# Patient Record
Sex: FEMALE | Race: WHITE | NOT HISPANIC OR LATINO | Employment: UNEMPLOYED | URBAN - METROPOLITAN AREA
[De-identification: names, ages, dates, MRNs, and addresses within clinical notes are randomized per-mention and may not be internally consistent; named-entity substitution may affect disease eponyms.]

---

## 2017-02-27 ENCOUNTER — HOSPITAL ENCOUNTER (EMERGENCY)
Facility: MEDICAL CENTER | Age: 42
End: 2017-02-27
Attending: EMERGENCY MEDICINE
Payer: COMMERCIAL

## 2017-02-27 ENCOUNTER — APPOINTMENT (OUTPATIENT)
Dept: RADIOLOGY | Facility: MEDICAL CENTER | Age: 42
End: 2017-02-27
Attending: EMERGENCY MEDICINE
Payer: COMMERCIAL

## 2017-02-27 VITALS
OXYGEN SATURATION: 98 % | TEMPERATURE: 97.6 F | DIASTOLIC BLOOD PRESSURE: 70 MMHG | HEART RATE: 61 BPM | HEIGHT: 64 IN | RESPIRATION RATE: 16 BRPM | BODY MASS INDEX: 21.34 KG/M2 | WEIGHT: 125 LBS | SYSTOLIC BLOOD PRESSURE: 127 MMHG

## 2017-02-27 DIAGNOSIS — G89.29 CHRONIC PELVIC PAIN IN FEMALE: ICD-10-CM

## 2017-02-27 DIAGNOSIS — R10.2 CHRONIC PELVIC PAIN IN FEMALE: ICD-10-CM

## 2017-02-27 LAB
ALBUMIN SERPL BCP-MCNC: 4.3 G/DL (ref 3.2–4.9)
ALBUMIN/GLOB SERPL: 1.4 G/DL
ALP SERPL-CCNC: 36 U/L (ref 30–99)
ALT SERPL-CCNC: 13 U/L (ref 2–50)
ANION GAP SERPL CALC-SCNC: 6 MMOL/L (ref 0–11.9)
APPEARANCE UR: CLEAR
AST SERPL-CCNC: 15 U/L (ref 12–45)
BASOPHILS # BLD AUTO: 1.4 % (ref 0–1.8)
BASOPHILS # BLD: 0.06 K/UL (ref 0–0.12)
BILIRUB SERPL-MCNC: 0.5 MG/DL (ref 0.1–1.5)
BILIRUB UR QL STRIP.AUTO: NEGATIVE
BUN SERPL-MCNC: 8 MG/DL (ref 8–22)
CALCIUM SERPL-MCNC: 9.5 MG/DL (ref 8.5–10.5)
CHLORIDE SERPL-SCNC: 108 MMOL/L (ref 96–112)
CO2 SERPL-SCNC: 23 MMOL/L (ref 20–33)
COLOR UR: COLORLESS
CREAT SERPL-MCNC: 0.68 MG/DL (ref 0.5–1.4)
CULTURE IF INDICATED INDCX: NO UA CULTURE
EOSINOPHIL # BLD AUTO: 0.08 K/UL (ref 0–0.51)
EOSINOPHIL NFR BLD: 1.8 % (ref 0–6.9)
ERYTHROCYTE [DISTWIDTH] IN BLOOD BY AUTOMATED COUNT: 42.5 FL (ref 35.9–50)
GFR SERPL CREATININE-BSD FRML MDRD: >60 ML/MIN/1.73 M 2
GLOBULIN SER CALC-MCNC: 3 G/DL (ref 1.9–3.5)
GLUCOSE SERPL-MCNC: 103 MG/DL (ref 65–99)
GLUCOSE UR STRIP.AUTO-MCNC: NEGATIVE MG/DL
HCG SERPL QL: NEGATIVE
HCT VFR BLD AUTO: 40.8 % (ref 37–47)
HGB BLD-MCNC: 14 G/DL (ref 12–16)
IMM GRANULOCYTES # BLD AUTO: 0.01 K/UL (ref 0–0.11)
IMM GRANULOCYTES NFR BLD AUTO: 0.2 % (ref 0–0.9)
KETONES UR STRIP.AUTO-MCNC: NEGATIVE MG/DL
LEUKOCYTE ESTERASE UR QL STRIP.AUTO: NEGATIVE
LIPASE SERPL-CCNC: 20 U/L (ref 11–82)
LYMPHOCYTES # BLD AUTO: 0.95 K/UL (ref 1–4.8)
LYMPHOCYTES NFR BLD: 21.9 % (ref 22–41)
MCH RBC QN AUTO: 32.1 PG (ref 27–33)
MCHC RBC AUTO-ENTMCNC: 34.3 G/DL (ref 33.6–35)
MCV RBC AUTO: 93.6 FL (ref 81.4–97.8)
MICRO URNS: NORMAL
MONOCYTES # BLD AUTO: 0.18 K/UL (ref 0–0.85)
MONOCYTES NFR BLD AUTO: 4.2 % (ref 0–13.4)
NEUTROPHILS # BLD AUTO: 3.05 K/UL (ref 2–7.15)
NEUTROPHILS NFR BLD: 70.5 % (ref 44–72)
NITRITE UR QL STRIP.AUTO: NEGATIVE
NRBC # BLD AUTO: 0 K/UL
NRBC BLD AUTO-RTO: 0 /100 WBC
PH UR STRIP.AUTO: 7.5 [PH]
PLATELET # BLD AUTO: 228 K/UL (ref 164–446)
PMV BLD AUTO: 9.4 FL (ref 9–12.9)
POTASSIUM SERPL-SCNC: 3.8 MMOL/L (ref 3.6–5.5)
PROT SERPL-MCNC: 7.3 G/DL (ref 6–8.2)
PROT UR QL STRIP: NEGATIVE MG/DL
RBC # BLD AUTO: 4.36 M/UL (ref 4.2–5.4)
RBC UR QL AUTO: NEGATIVE
SODIUM SERPL-SCNC: 137 MMOL/L (ref 135–145)
SP GR UR STRIP.AUTO: 1.01
WBC # BLD AUTO: 4.3 K/UL (ref 4.8–10.8)

## 2017-02-27 PROCEDURE — 85025 COMPLETE CBC W/AUTO DIFF WBC: CPT

## 2017-02-27 PROCEDURE — 83690 ASSAY OF LIPASE: CPT

## 2017-02-27 PROCEDURE — 99284 EMERGENCY DEPT VISIT MOD MDM: CPT

## 2017-02-27 PROCEDURE — 36415 COLL VENOUS BLD VENIPUNCTURE: CPT

## 2017-02-27 PROCEDURE — 74177 CT ABD & PELVIS W/CONTRAST: CPT

## 2017-02-27 PROCEDURE — 80053 COMPREHEN METABOLIC PANEL: CPT

## 2017-02-27 PROCEDURE — 84703 CHORIONIC GONADOTROPIN ASSAY: CPT

## 2017-02-27 PROCEDURE — 82272 OCCULT BLD FECES 1-3 TESTS: CPT

## 2017-02-27 PROCEDURE — 700117 HCHG RX CONTRAST REV CODE 255: Performed by: EMERGENCY MEDICINE

## 2017-02-27 PROCEDURE — 81003 URINALYSIS AUTO W/O SCOPE: CPT

## 2017-02-27 RX ADMIN — IOHEXOL 100 ML: 350 INJECTION, SOLUTION INTRAVENOUS at 13:15

## 2017-02-27 ASSESSMENT — LIFESTYLE VARIABLES: DO YOU DRINK ALCOHOL: NO

## 2017-02-27 ASSESSMENT — PAIN SCALES - GENERAL: PAINLEVEL_OUTOF10: 7

## 2017-02-27 NOTE — ED AVS SNAPSHOT
2/27/2017          Linn Mcconnell  [address]  [place] [address]    Dear Linn:    [facility] wants to ensure your discharge home is safe and you or your loved ones have had all your questions answered regarding your care after you leave the hospital.    You may receive a telephone call within two days of your discharge.  This call is to make certain you understand your discharge instructions as well as ensure we provided you with the best care possible during your stay with us.     The call will only last approximately 3-5 minutes and will be done by a nurse.    Once again, we want to ensure your discharge home is safe and that you have a clear understanding of any next steps in your care.  If you have any questions or concerns, please do not hesitate to contact us, we are here for you.  Thank you for choosing [facility] for your healthcare needs.    Sincerely,    Luis E Dowell    [facility]

## 2017-02-27 NOTE — ED PROVIDER NOTES
"ED Provider Note    CHIEF COMPLAINT  Chief Complaint   Patient presents with   • Abdominal Pain     lower pelvic pain \"feels like something is in there moving\", \"it flares up every other week usually with my menstrual cycle\".   • Abscess     \"between my colon and my uterus\".  Pt reports it was drained on Sept 1, 2016.  Pt had transvaginal US done last week which shows \"hematoma in my uterus\"   • Menstrual Problem     frequent irregular periods.  Pt had uterine ablation 3 years ago.       HPI  Linn Mcconnell is a 41 y.o. female who presents for evaluation of long-standing crampy intermittent lower abdominal pain intermittent vaginal body. The patient is accompanied and history. Apparently she had an appendectomy about 6 months ago with Dr. Michele. She subsequently developed a postoperative abscess which required drainage. She is also a complex gynecological history including uterine ablation and tubal ligation and chronic pelvic pain. She is followed by gynecology with Dr. Mclean. She has intermittent episodes of pain. She has described this to Dr. Michele and he recommended when her pain \"flares up to come and get a contrasted CT scan. She denies any vaginal bleeding currently no fevers or chills dysuria. Pain is primarily lower abdomen comes and goes slightly left of midline    REVIEW OF SYSTEMS  See HPI for further details. no fevers flank pain dysuria or hematuria All other systems are negative.     PAST MEDICAL HISTORY  Past Medical History   Diagnosis Date   • Osteopenia    • Vasovagal near syncope 4/25/2011   • Personal history of colonic polyps 4/25/2011   • Anesthesia      PONV   • Gynecological disorder      uterine ablation       FAMILY HISTORY  Non-contributory    SOCIAL HISTORY  Social History     Social History   • Marital Status:      Spouse Name: N/A   • Number of Children: N/A   • Years of Education: N/A     Social History Main Topics   • Smoking status: Never Smoker    • Smokeless " tobacco: Never Used   • Alcohol Use: No   • Drug Use: Yes      Comment: medical marijuana oil   • Sexual Activity:     Partners: Male     Birth Control/ Protection: IUD      Comment: mirena      Other Topics Concern   • None     Social History Narrative       SURGICAL HISTORY  Past Surgical History   Procedure Laterality Date   • Primary c section  6/25/04     twins   • Primary c section  2/24/08   • Tubal coagulation laparoscopic bilateral  5/8/2014     Performed by Jolie Mclean M.D. at SURGERY SAME DAY [facility] ORS   • Hysteroscopy novasure-2  5/8/2014     Performed by Jolie Mclean M.D. at SURGERY SAME DAY [facility] ORS   • Other orthopedic surgery Left      hip surgery   • Other neurological surg       spinal cord stimulator   • Pr inj for sacroiliac jt anesth Right 11/25/2015     Procedure: INJ, SACROILIAC, ANES/STEROID;  Surgeon: Alfonso Garcia M.D.;  [place]  Service: Pain Management   • Pr inj lumbar/sacral,w/wo cntrst  3/9/2016     Procedure: INJ EPI NON NEUROLYTIC L/S L5/ S1;  Surgeon: Alfonso Garcia M.D.;  [place];  Service: Pain Management   • Pr fluorscopic guidance spinal injection  3/9/2016     Procedure: FLUOROGUIDE FOR SPINAL INJ;  Surgeon: Alfonso Garcia M.D.;  [place];  Service: Pain Management   • Vulvectomy partial  6/21/2016     Procedure: PARTIAL SIMPLE VULVECTOMY ;  Surgeon: Jolie Mclean M.D.;  Location: SURGERY SAME DAY [facility] ORS;  Service:    • Appendectomy laparoscopic  8/18/2016     Procedure: APPENDECTOMY LAPAROSCOPIC;  Surgeon: Alexander Michele M.D.;  Location: [facility];  Service:    • Other  2014     CRPS       CURRENT MEDICATIONS  Home Medications     **Home medications have not yet been reviewed for this encounter**        No current facility-administered medications for this encounter.    Current outpatient prescriptions:   •  ondansetron (ZOFRAN ODT) 4  "MG TABLET DISPERSIBLE, Take 1 Tab by mouth every four hours as needed for Nausea/Vomiting., Disp: 10 Tab, Rfl: 0  •  Ascorbic Acid (VITAMIN C) 1000 MG Tab, Take 1 Tab by mouth every day., Disp: , Rfl:   •  B Complex Vitamins (VITAMIN B COMPLEX PO), Take 1 Tab by mouth every day., Disp: , Rfl:     ALLERGIES  Allergies   Allergen Reactions   • Fentanyl Vomiting   • Flagyl [Metronidazole]      \"non-stop vomiting\"   • Lactated Ringers      Family history of mitochondrial disease   • Norco [Apap-Fd&C Yellow #10 [place]-Hydrocodone] Vomiting   • Percocet [Apap-Fd&C Red #40 [place]-Oxycodone] Vomiting   • Phenergan [Promethazine]    • Scopolamine Unspecified     Blurry eyes/dizzy     • Vicodin [Hydrocodone-Acetaminophen] Vomiting       PHYSICAL EXAM  VITAL SIGNS: /70 mmHg  Pulse 64  Temp(Src) 36.4 °C (97.6 °F) (Temporal)  Resp 16  Ht 1.626 m (5' 4.02\")  Wt 56.7 kg (125 lb)  BMI 21.45 kg/m2  SpO2 98%      Constitutional: Well developed, Well nourished, No acute distress, Non-toxic appearance.   HENT: Normocephalic, Atraumatic, Bilateral external ears normal, Oropharynx moist, No oral exudates, Nose normal.   Eyes: PERRLA, EOMI, Conjunctiva normal, No discharge.   Neck: Normal range of motion, No tenderness, Supple, No stridor.   Cardiovascular: Normal heart rate, Normal rhythm, No murmurs, No rubs, No gallops.   Thorax & Lungs: Normal breath sounds, No respiratory distress, No wheezing, No chest tenderness.   Abdomen: Bowel sounds normal, Soft, diffuse lower abdominal tenderness no hernias or masses no rebound guarding or rigidity.   Skin: Warm, Dry, No erythema, No rash.   Back: No tenderness, No CVA tenderness.   phincter tone. No external or internal lesions noted. Stool is normal color and heme negative.   Extremities: Intact distal pulses, No edema, No tenderness, No cyanosis, No clubbing.   Neurologic: Alert & oriented x 3, Normal motor function, Normal sensory function, No focal deficits noted. "   Psychiatric: Anxious      RADIOLOGY/PROCEDURES  CT-ABDOMEN-PELVIS WITH   Final Result      1.  No evidence of abdominal or pelvic mass, adenopathy, or inflammatory change. No evidence of residual or recurrent abscess in the LEFT lower quadrant.   2.  Probable prior appendectomy                   Results for orders placed or performed during the hospital encounter of 02/27/17   HCG QUAL SERUM   Result Value Ref Range    Beta-Hcg Qualitative Serum Negative Negative   CBC WITH DIFFERENTIAL   Result Value Ref Range    WBC 4.3 (L) 4.8 - 10.8 K/uL    RBC 4.36 4.20 - 5.40 M/uL    Hemoglobin 14.0 12.0 - 16.0 g/dL    Hematocrit 40.8 37.0 - 47.0 %    MCV 93.6 81.4 - 97.8 fL    MCH 32.1 27.0 - 33.0 pg    MCHC 34.3 33.6 - 35.0 g/dL    RDW 42.5 35.9 - 50.0 fL    Platelet Count 228 164 - 446 K/uL    MPV 9.4 9.0 - 12.9 fL    Neutrophils-Polys 70.50 44.00 - 72.00 %    Lymphocytes 21.90 (L) 22.00 - 41.00 %    Monocytes 4.20 0.00 - 13.40 %    Eosinophils 1.80 0.00 - 6.90 %    Basophils 1.40 0.00 - 1.80 %    Immature Granulocytes 0.20 0.00 - 0.90 %    Nucleated RBC 0.00 /100 WBC    Neutrophils (Absolute) 3.05 2.00 - 7.15 K/uL    Lymphs (Absolute) 0.95 (L) 1.00 - 4.80 K/uL    Monos (Absolute) 0.18 0.00 - 0.85 K/uL    Eos (Absolute) 0.08 0.00 - 0.51 K/uL    Baso (Absolute) 0.06 0.00 - 0.12 K/uL    Immature Granulocytes (abs) 0.01 0.00 - 0.11 K/uL    NRBC (Absolute) 0.00 K/uL   COMP METABOLIC PANEL   Result Value Ref Range    Sodium 137 135 - 145 mmol/L    Potassium 3.8 3.6 - 5.5 mmol/L    Chloride 108 96 - 112 mmol/L    Co2 23 20 - 33 mmol/L    Anion Gap 6.0 0.0 - 11.9    Glucose 103 (H) 65 - 99 mg/dL    Bun 8 8 - 22 mg/dL    Creatinine 0.68 0.50 - 1.40 mg/dL    Calcium 9.5 8.5 - 10.5 mg/dL    AST(SGOT) 15 12 - 45 U/L    ALT(SGPT) 13 2 - 50 U/L    Alkaline Phosphatase 36 30 - 99 U/L    Total Bilirubin 0.5 0.1 - 1.5 mg/dL    Albumin 4.3 3.2 - 4.9 g/dL    Total Protein 7.3 6.0 - 8.2 g/dL    Globulin 3.0 1.9 - 3.5 g/dL    A-G Ratio 1.4  g/dL   LIPASE   Result Value Ref Range    Lipase 20 11 - 82 U/L   URINALYSIS,CULTURE IF INDICATED   Result Value Ref Range    Color Colorless     Character Clear     Specific Gravity 1.007 <1.035    Ph 7.5 5.0-8.0    Glucose Negative Negative mg/dL    Ketones Negative Negative mg/dL    Protein Negative Negative mg/dL    Bilirubin Negative Negative    Nitrite Negative Negative    Leukocyte Esterase Negative Negative    Occult Blood Negative Negative    Micro Urine Req see below     Culture Indicated No UA Culture   ESTIMATED GFR   Result Value Ref Range    GFR If African American >60 >60 mL/min/1.73 m 2    GFR If Non African American >60 >60 mL/min/1.73 m 2      COURSE & MEDICAL DECISION MAKING  Pertinent Labs & Imaging studies reviewed. (See chart for details)  Patient here is no evidence of urinary tract infection renal to this and see liver or gallbladder lab test abnormalities she is not pregnant of leukocytosis or anemia and contrasted CT scan demonstrates no evidence of recurred abscess. The patient was essentially sent here to rule that out. I'll give her copies of her workup and have her follow-up with her gynecologist and surgeon as needed    FINAL IMPRESSION  1.   1. Chronic pelvic pain in female               Electronically signed by: Polo Richey, 2/27/2017 12:04 PM

## 2017-02-27 NOTE — DISCHARGE INSTRUCTIONS
Abdominal Pain, Women  Abdominal (stomach, pelvic, or belly) pain can be caused by many things. It is important to tell your doctor:  · The location of the pain.  · Does it come and go or is it present all the time?  · Are there things that start the pain (eating certain foods, exercise)?  · Are there other symptoms associated with the pain (fever, nausea, vomiting, diarrhea)?  All of this is helpful to know when trying to find the cause of the pain.  CAUSES   · Stomach: virus or bacteria infection, or ulcer.  · Intestine: appendicitis (inflamed appendix), regional ileitis (Crohn's disease), ulcerative colitis (inflamed colon), irritable bowel syndrome, diverticulitis (inflamed diverticulum of the colon), or cancer of the stomach or intestine.  · Gallbladder disease or stones in the gallbladder.  · Kidney disease, kidney stones, or infection.  · Pancreas infection or cancer.  · Fibromyalgia (pain disorder).  · Diseases of the female organs:  · Uterus: fibroid (non-cancerous) tumors or infection.  · Fallopian tubes: infection or tubal pregnancy.  · Ovary: cysts or tumors.  · Pelvic adhesions (scar tissue).  · Endometriosis (uterus lining tissue growing in the pelvis and on the pelvic organs).  · Pelvic congestion syndrome (female organs filling up with blood just before the menstrual period).  · Pain with the menstrual period.  · Pain with ovulation (producing an egg).  · Pain with an IUD (intrauterine device, birth control) in the uterus.  · Cancer of the female organs.  · Functional pain (pain not caused by a disease, may improve without treatment).  · Psychological pain.  · Depression.  DIAGNOSIS   Your doctor will decide the seriousness of your pain by doing an examination.  · Blood tests.  · X-rays.  · Ultrasound.  · CT scan (computed tomography, special type of X-ray).  · MRI (magnetic resonance imaging).  · Cultures, for infection.  · Barium enema (dye inserted in the large intestine, to better view it with  X-rays).  · Colonoscopy (looking in intestine with a lighted tube).  · Laparoscopy (minor surgery, looking in abdomen with a lighted tube).  · Major abdominal exploratory surgery (looking in abdomen with a large incision).  TREATMENT   The treatment will depend on the cause of the pain.   · Many cases can be observed and treated at home.  · Over-the-counter medicines recommended by your caregiver.  · Prescription medicine.  · Antibiotics, for infection.  · Birth control pills, for painful periods or for ovulation pain.  · Hormone treatment, for endometriosis.  · Nerve blocking injections.  · Physical therapy.  · Antidepressants.  · Counseling with a psychologist or psychiatrist.  · Minor or major surgery.  HOME CARE INSTRUCTIONS   · Do not take laxatives, unless directed by your caregiver.  · Take over-the-counter pain medicine only if ordered by your caregiver. Do not take aspirin because it can cause an upset stomach or bleeding.  · Try a clear liquid diet (broth or water) as ordered by your caregiver. Slowly move to a bland diet, as tolerated, if the pain is related to the stomach or intestine.  · Have a thermometer and take your temperature several times a day, and record it.  · Bed rest and sleep, if it helps the pain.  · Avoid sexual intercourse, if it causes pain.  · Avoid stressful situations.  · Keep your follow-up appointments and tests, as your caregiver orders.  · If the pain does not go away with medicine or surgery, you may try:  · Acupuncture.  · Relaxation exercises (yoga, meditation).  · Group therapy.  · Counseling.  SEEK MEDICAL CARE IF:   · You notice certain foods cause stomach pain.  · Your home care treatment is not helping your pain.  · You need stronger pain medicine.  · You want your IUD removed.  · You feel faint or lightheaded.  · You develop nausea and vomiting.  · You develop a rash.  · You are having side effects or an allergy to your medicine.  SEEK IMMEDIATE MEDICAL CARE IF:   · Your  pain does not go away or gets worse.  · You have a fever.  · Your pain is felt only in portions of the abdomen. The right side could possibly be appendicitis. The left lower portion of the abdomen could be colitis or diverticulitis.  · You are passing blood in your stools (bright red or black tarry stools, with or without vomiting).  · You have blood in your urine.  · You develop chills, with or without a fever.  · You pass out.  MAKE SURE YOU:   · Understand these instructions.  · Will watch your condition.  · Will get help right away if you are not doing well or get worse.  Document Released: 10/14/2008 Document Revised: 03/11/2013 Document Reviewed: 11/04/2010  [employer]® Patient Information ©2014 [employer], [employer].

## 2017-02-27 NOTE — ED NOTES
"Linn Mcconnell 41 y.o. female ambulatory to triage for     Chief Complaint   Patient presents with   • Abdominal Pain     lower pelvic pain \"feels like something is in there moving\", \"it flares up every other week usually with my menstrual cycle\".   • Abscess     \"between my colon and my uterus\".  Pt reports it was drained on Sept 1, 2016.  Pt had transvaginal US done last week which shows \"hematoma in my uterus\"   • Menstrual Problem     frequent irregular periods.  Pt had uterine ablation 3 years ago.     Pt was sent by Dr Michele for evaluation and CT.  Pt was told to come when she had a \"flare-up\"  /70 mmHg  Pulse 66  Temp(Src) 36.4 °C (97.6 °F) (Temporal)  Resp 16  Ht 1.626 m (5' 4.02\")  Wt 56.7 kg (125 lb)  BMI 21.45 kg/m2  SpO2 99%  Pt directed to return to the ED lobby to await bed assignment.  Advised to return to the triage desk for any changes/concerns.  "

## 2017-02-27 NOTE — ED AVS SNAPSHOT
Home Care Instructions                                                                                                                Linn Mcconnell   MRN: [id number]    Department:  [facility], Emergency Dept   Date of Visit:  2/27/2017            [facility], Emergency Dept    [address]    [facility] [address]    Phone:  [phone]      You were seen by     Polo Richey M.D.      Your Diagnosis Was     Chronic pelvic pain in female     R10.2, G89.29       These are the medications you received during your hospitalization from 02/27/2017 1034 to 02/27/2017 1345     Date/Time Order Dose Route Action    02/27/2017 1315 iohexol (OMNIPAQUE) 350 mg/mL 100 mL Intravenous Given      Follow-up Information     1. Follow up with Jolie Mclean M.D. In 2 days.    Specialty:  OB/Gyn    Why:  As needed, If symptoms worsen    Contact information    [address]  [address]  [facility] [address]  [phone]        Medication Information     Review all of your home medications and newly ordered medications with your primary doctor and/or pharmacist as soon as possible. Follow medication instructions as directed by your doctor and/or pharmacist.     Please keep your complete medication list with you and share with your physician. Update the information when medications are discontinued, doses are changed, or new medications (including over-the-counter products) are added; and carry medication information at all times in the event of emergency situations.               Medication List      ASK your doctor about these medications        Instructions    ondansetron 4 MG Tbdp   Commonly known as:  ZOFRAN ODT    Take 1 Tab by mouth every four hours as needed for Nausea/Vomiting.   Dose:  4 mg       VITAMIN B COMPLEX PO    Take 1 Tab by mouth every day.   Dose:  1 Tab       Vitamin C 1000 MG Tabs    Take 1 Tab by mouth every day.   Dose:  1 Tab               Procedures and tests  performed during your visit     CBC WITH DIFFERENTIAL    COMP METABOLIC PANEL    CONSENT FOR CONTRAST INJECTION    CT-ABDOMEN-PELVIS WITH    ESTIMATED GFR    HCG QUAL SERUM    LIPASE    SALINE LOCK    URINALYSIS,CULTURE IF INDICATED        Discharge Instructions       Abdominal Pain, Women  Abdominal (stomach, pelvic, or belly) pain can be caused by many things. It is important to tell your doctor:  · The location of the pain.  · Does it come and go or is it present all the time?  · Are there things that start the pain (eating certain foods, exercise)?  · Are there other symptoms associated with the pain (fever, nausea, vomiting, diarrhea)?  All of this is helpful to know when trying to find the cause of the pain.  CAUSES   · Stomach: virus or bacteria infection, or ulcer.  · Intestine: appendicitis (inflamed appendix), regional ileitis (Crohn's disease), ulcerative colitis (inflamed colon), irritable bowel syndrome, diverticulitis (inflamed diverticulum of the colon), or cancer of the stomach or intestine.  · Gallbladder disease or stones in the gallbladder.  · Kidney disease, kidney stones, or infection.  · Pancreas infection or cancer.  · Fibromyalgia (pain disorder).  · Diseases of the female organs:  · Uterus: fibroid (non-cancerous) tumors or infection.  · Fallopian tubes: infection or tubal pregnancy.  · Ovary: cysts or tumors.  · Pelvic adhesions (scar tissue).  · Endometriosis (uterus lining tissue growing in the pelvis and on the pelvic organs).  · Pelvic congestion syndrome (female organs filling up with blood just before the menstrual period).  · Pain with the menstrual period.  · Pain with ovulation (producing an egg).  · Pain with an IUD (intrauterine device, birth control) in the uterus.  · Cancer of the female organs.  · Functional pain (pain not caused by a disease, may improve without treatment).  · Psychological pain.  · Depression.  DIAGNOSIS   Your doctor will decide the seriousness of your pain  by doing an examination.  · Blood tests.  · X-rays.  · Ultrasound.  · CT scan (computed tomography, special type of X-ray).  · MRI (magnetic resonance imaging).  · Cultures, for infection.  · Barium enema (dye inserted in the large intestine, to better view it with X-rays).  · Colonoscopy (looking in intestine with a lighted tube).  · Laparoscopy (minor surgery, looking in abdomen with a lighted tube).  · Major abdominal exploratory surgery (looking in abdomen with a large incision).  TREATMENT   The treatment will depend on the cause of the pain.   · Many cases can be observed and treated at home.  · Over-the-counter medicines recommended by your caregiver.  · Prescription medicine.  · Antibiotics, for infection.  · Birth control pills, for painful periods or for ovulation pain.  · Hormone treatment, for endometriosis.  · Nerve blocking injections.  · Physical therapy.  · Antidepressants.  · Counseling with a psychologist or psychiatrist.  · Minor or major surgery.  HOME CARE INSTRUCTIONS   · Do not take laxatives, unless directed by your caregiver.  · Take over-the-counter pain medicine only if ordered by your caregiver. Do not take aspirin because it can cause an upset stomach or bleeding.  · Try a clear liquid diet (broth or water) as ordered by your caregiver. Slowly move to a bland diet, as tolerated, if the pain is related to the stomach or intestine.  · Have a thermometer and take your temperature several times a day, and record it.  · Bed rest and sleep, if it helps the pain.  · Avoid sexual intercourse, if it causes pain.  · Avoid stressful situations.  · Keep your follow-up appointments and tests, as your caregiver orders.  · If the pain does not go away with medicine or surgery, you may try:  · Acupuncture.  · Relaxation exercises (yoga, meditation).  · Group therapy.  · Counseling.  SEEK MEDICAL CARE IF:   · You notice certain foods cause stomach pain.  · Your home care treatment is not helping your  pain.  · You need stronger pain medicine.  · You want your IUD removed.  · You feel faint or lightheaded.  · You develop nausea and vomiting.  · You develop a rash.  · You are having side effects or an allergy to your medicine.  SEEK IMMEDIATE MEDICAL CARE IF:   · Your pain does not go away or gets worse.  · You have a fever.  · Your pain is felt only in portions of the abdomen. The right side could possibly be appendicitis. The left lower portion of the abdomen could be colitis or diverticulitis.  · You are passing blood in your stools (bright red or black tarry stools, with or without vomiting).  · You have blood in your urine.  · You develop chills, with or without a fever.  · You pass out.  MAKE SURE YOU:   · Understand these instructions.  · Will watch your condition.  · Will get help right away if you are not doing well or get worse.  Document Released: 10/14/2008 Document Revised: 03/11/2013 Document Reviewed: 11/04/2010  [employer]® Patient Information ©2014 [employer].            Patient Information     Patient Information    Following emergency treatment: all patient requiring follow-up care must return either to a private physician or a clinic if your condition worsens before you are able to obtain further medical attention, please return to the emergency room.     Billing Information    At [facility], we work to make the billing process streamlined for our patients.  Our Representatives are here to answer any questions you may have regarding your hospital bill.  If you have insurance coverage and have supplied your insurance information to us, we will submit a claim to your insurer on your behalf.  Should you have any questions regarding your bill, we can be reached online or by phone as follows:  Online: You are able pay your bills online or live chat with our representatives about any billing questions you may have. We are here to help Monday - Friday from 8:00am to 7:30pm and 9:00am - 12:00pm on  Saturdays.  Please visit [email or web address]  for more information.   Phone:  [phone] or [phone]    Please note that your emergency physician, surgeon, pathologist, radiologist, anesthesiologist, and other specialists are not employed by [facility] and will therefore bill separately for their services.  Please contact them directly for any questions concerning their bills at the numbers below:     Emergency Physician Services:  [phone]  [facility]:  [phone]  [facility]:  [phone]  [facility]:  [phone]    1. Your final bill may vary from the amount quoted upon discharge if all procedures are not complete at that time, or if your doctor has additional procedures of which we are not aware. You will receive an additional bill if you return to the Emergency Department at [facility] for suture removal regardless of the facility of which the sutures were placed.     2. Please arrange for settlement of this account at the emergency registration.    3. All self-pay accounts are due in full at the time of treatment.  If you are unable to meet this obligation then payment is expected within 4-5 days.     4. If you have had radiology studies (CT, X-ray, Ultrasound, MRI), you have received a preliminary result during your emergency department visit. Please contact the radiology department [phone] to receive a copy of your final result. Please discuss the Final result with your primary physician or with the follow up physician provided.     Crisis Hotline:  [place] Crisis Hotline:  2-018-ZBKAJZH or 1-539.211.8264  [place] Crisis Hotline:    1-713.890.8505 or 263-139-4735         ED Discharge Follow Up Questions    1. In order to provide you with very good care, we would like to follow up with a phone call in the next few days.  May we have your permission to contact you?     YES /  NO    2. What is the best  phone number to call you? (       )_____-__________    3. What is the best time to call you?      Morning  /  Afternoon  /  Evening                   Patient Signature:  ____________________________________________________________    Date:  ____________________________________________________________

## 2017-02-27 NOTE — ED AVS SNAPSHOT
citiservi Access Code: Activation code not generated  Current citiservi Status: Active    [employer]  A secure, online tool to manage your health information     [employer]’s citiservi® is a secure, online tool that connects you to your personalized health information from the privacy of your home -- day or night - making it very easy for you to manage your healthcare. Once the activation process is completed, you can even access your medical information using the citiservi dee, which is available for free in the Apple Dee store or Google Play store.     citiservi provides the following levels of access (as shown below):   My Chart Features   [facility] Primary Care Doctor [facility]  Specialists [facility]  Urgent  Care Non-[facility]  Primary Care  Doctor   Email your healthcare team securely and privately 24/7 X X X X   Manage appointments: schedule your next appointment; view details of past/upcoming appointments X      Request prescription refills. X      View recent personal medical records, including lab and immunizations X X X X   View health record, including health history, allergies, medications X X X X   Read reports about your outpatient visits, procedures, consult and ER notes X X X X   See your discharge summary, which is a recap of your hospital and/or ER visit that includes your diagnosis, lab results, and care plan. X X       How to register for citiservi:  1. Go to  [email or web address].  2. Click on the Sign Up Now box, which takes you to the New Member Sign Up page. You will need to provide the following information:  a. Enter your citiservi Access Code exactly as it appears at the top of this page. (You will not need to use this code after you’ve completed the sign-up process. If you do not sign up before the expiration date, you must request a new code.)   b. Enter your date of birth.   c. Enter your home email address.   d. Click Submit, and follow the next screen’s instructions.  3. Create a citiservi ID. This will  be your [employer] login ID and cannot be changed, so think of one that is secure and easy to remember.  4. Create a [employer] password. You can change your password at any time.  5. Enter your Password Reset Question and Answer. This can be used at a later time if you forget your password.   6. Enter your e-mail address. This allows you to receive e-mail notifications when new information is available in [employer].  7. Click Sign Up. You can now view your health information.    For assistance activating your [employer] account, call [phone]

## 2017-03-13 ENCOUNTER — HOSPITAL ENCOUNTER (OUTPATIENT)
Dept: RADIOLOGY | Facility: REHABILITATION | Age: 42
End: 2017-03-13
Attending: ANESTHESIOLOGY
Payer: COMMERCIAL

## 2017-03-13 ENCOUNTER — HOSPITAL ENCOUNTER (OUTPATIENT)
Dept: PAIN MANAGEMENT | Facility: REHABILITATION | Age: 42
End: 2017-03-13
Attending: ANESTHESIOLOGY
Payer: COMMERCIAL

## 2017-03-13 VITALS
BODY MASS INDEX: 21.64 KG/M2 | SYSTOLIC BLOOD PRESSURE: 121 MMHG | OXYGEN SATURATION: 100 % | RESPIRATION RATE: 16 BRPM | HEIGHT: 64 IN | DIASTOLIC BLOOD PRESSURE: 79 MMHG | WEIGHT: 126.76 LBS | HEART RATE: 66 BPM | TEMPERATURE: 98.6 F

## 2017-03-13 PROCEDURE — 700111 HCHG RX REV CODE 636 W/ 250 OVERRIDE (IP)

## 2017-03-13 PROCEDURE — 700117 HCHG RX CONTRAST REV CODE 255

## 2017-03-13 PROCEDURE — 62323 NJX INTERLAMINAR LMBR/SAC: CPT

## 2017-03-13 RX ORDER — BETAMETHASONE SODIUM PHOSPHATE AND BETAMETHASONE ACETATE 3; 3 MG/ML; MG/ML
INJECTION, SUSPENSION INTRA-ARTICULAR; INTRALESIONAL; INTRAMUSCULAR; SOFT TISSUE
Status: COMPLETED
Start: 2017-03-13 | End: 2017-03-13

## 2017-03-13 RX ADMIN — IOHEXOL 1 ML: 240 INJECTION, SOLUTION INTRATHECAL; INTRAVASCULAR; INTRAVENOUS; ORAL at 12:04

## 2017-03-13 RX ADMIN — BETAMETHASONE SODIUM PHOSPHATE AND BETAMETHASONE ACETATE 12 MG: 3; 3 INJECTION, SUSPENSION INTRA-ARTICULAR; INTRALESIONAL; INTRAMUSCULAR at 12:04

## 2017-03-13 ASSESSMENT — PAIN SCALES - GENERAL
PAINLEVEL_OUTOF10: 0
PAINLEVEL_OUTOF10: 6

## 2017-03-13 NOTE — PROGRESS NOTES
Patient positioned pre-procedure by RN,CNA and xray tech. Pillow placed under lower legs and feet for support.

## 2017-05-16 PROBLEM — R20.0 NUMBNESS AND TINGLING OF LEFT HAND: Status: ACTIVE | Noted: 2017-05-16

## 2017-05-16 PROBLEM — G90.522 COMPLEX REGIONAL PAIN SYNDROME TYPE 1 OF LEFT LOWER EXTREMITY: Status: ACTIVE | Noted: 2017-05-16

## 2017-05-16 PROBLEM — R20.2 NUMBNESS AND TINGLING OF LEFT HAND: Status: ACTIVE | Noted: 2017-05-16

## 2017-07-10 ENCOUNTER — HOSPITAL ENCOUNTER (OUTPATIENT)
Dept: PAIN MANAGEMENT | Facility: REHABILITATION | Age: 42
End: 2017-07-10
Attending: ANESTHESIOLOGY
Payer: COMMERCIAL

## 2017-07-10 ENCOUNTER — HOSPITAL ENCOUNTER (OUTPATIENT)
Dept: RADIOLOGY | Facility: REHABILITATION | Age: 42
End: 2017-07-10
Attending: ANESTHESIOLOGY
Payer: COMMERCIAL

## 2017-07-10 VITALS
HEIGHT: 64 IN | BODY MASS INDEX: 21.75 KG/M2 | HEART RATE: 62 BPM | SYSTOLIC BLOOD PRESSURE: 130 MMHG | DIASTOLIC BLOOD PRESSURE: 71 MMHG | OXYGEN SATURATION: 100 % | TEMPERATURE: 99.1 F | WEIGHT: 127.43 LBS | RESPIRATION RATE: 18 BRPM

## 2017-07-10 PROCEDURE — 700111 HCHG RX REV CODE 636 W/ 250 OVERRIDE (IP)

## 2017-07-10 PROCEDURE — 700117 HCHG RX CONTRAST REV CODE 255

## 2017-07-10 PROCEDURE — 62323 NJX INTERLAMINAR LMBR/SAC: CPT

## 2017-07-10 RX ORDER — BETAMETHASONE SODIUM PHOSPHATE AND BETAMETHASONE ACETATE 3; 3 MG/ML; MG/ML
INJECTION, SUSPENSION INTRA-ARTICULAR; INTRALESIONAL; INTRAMUSCULAR; SOFT TISSUE
Status: COMPLETED
Start: 2017-07-10 | End: 2017-07-10

## 2017-07-10 RX ADMIN — IOHEXOL 1 ML: 240 INJECTION, SOLUTION INTRATHECAL; INTRAVASCULAR; INTRAVENOUS; ORAL at 11:55

## 2017-07-10 RX ADMIN — BETAMETHASONE SODIUM PHOSPHATE AND BETAMETHASONE ACETATE 12 MG: 3; 3 INJECTION, SUSPENSION INTRA-ARTICULAR; INTRALESIONAL; INTRAMUSCULAR at 11:58

## 2017-07-10 ASSESSMENT — PAIN SCALES - GENERAL
PAINLEVEL_OUTOF10: 4
PAINLEVEL_OUTOF10: 4
PAINLEVEL_OUTOF10: 6

## 2017-07-10 NOTE — PROGRESS NOTES
Current meds. See medication reconciliation form. Reviewed with pt. Pt denies taking ASA,other blood thinners or anti-inflammatories. Pt has a ride post-procedure (, Brian is ). Printed and verbal discharge instructions given to pt who verbalized understanding.

## 2017-07-10 NOTE — PROGRESS NOTES
Patient positioned by RN    X- ray Tech. Head supported on pillow . Ankle supported on pillow. Arms supported by stool at head of the bed.

## 2017-08-22 ENCOUNTER — APPOINTMENT (OUTPATIENT)
Dept: ADMISSIONS | Facility: MEDICAL CENTER | Age: 42
End: 2017-08-22
Payer: COMMERCIAL

## 2017-08-22 ENCOUNTER — APPOINTMENT (OUTPATIENT)
Dept: LAB | Facility: MEDICAL CENTER | Age: 42
End: 2017-08-22
Payer: COMMERCIAL

## 2017-08-22 DIAGNOSIS — Z01.812 PRE-OPERATIVE LABORATORY EXAMINATION: ICD-10-CM

## 2017-08-22 LAB
ABO GROUP BLD: NORMAL
ALBUMIN SERPL BCP-MCNC: 4.4 G/DL (ref 3.2–4.9)
ALBUMIN/GLOB SERPL: 1.4 G/DL
ALP SERPL-CCNC: 41 U/L (ref 30–99)
ALT SERPL-CCNC: 19 U/L (ref 2–50)
ANION GAP SERPL CALC-SCNC: 7 MMOL/L (ref 0–11.9)
APPEARANCE UR: CLEAR
AST SERPL-CCNC: 23 U/L (ref 12–45)
BASOPHILS # BLD AUTO: 1.6 % (ref 0–1.8)
BASOPHILS # BLD: 0.07 K/UL (ref 0–0.12)
BILIRUB SERPL-MCNC: 0.6 MG/DL (ref 0.1–1.5)
BILIRUB UR QL STRIP.AUTO: NEGATIVE
BLD GP AB SCN SERPL QL: NORMAL
BUN SERPL-MCNC: 8 MG/DL (ref 8–22)
CALCIUM SERPL-MCNC: 10 MG/DL (ref 8.5–10.5)
CHLORIDE SERPL-SCNC: 107 MMOL/L (ref 96–112)
CO2 SERPL-SCNC: 24 MMOL/L (ref 20–33)
COLOR UR: YELLOW
CREAT SERPL-MCNC: 0.74 MG/DL (ref 0.5–1.4)
CULTURE IF INDICATED INDCX: NO UA CULTURE
EOSINOPHIL # BLD AUTO: 0.16 K/UL (ref 0–0.51)
EOSINOPHIL NFR BLD: 3.7 % (ref 0–6.9)
ERYTHROCYTE [DISTWIDTH] IN BLOOD BY AUTOMATED COUNT: 41.7 FL (ref 35.9–50)
GFR SERPL CREATININE-BSD FRML MDRD: >60 ML/MIN/1.73 M 2
GLOBULIN SER CALC-MCNC: 3.1 G/DL (ref 1.9–3.5)
GLUCOSE SERPL-MCNC: 88 MG/DL (ref 65–99)
GLUCOSE UR STRIP.AUTO-MCNC: NEGATIVE MG/DL
HCG SERPL QL: NEGATIVE
HCT VFR BLD AUTO: 42.6 % (ref 37–47)
HGB BLD-MCNC: 14.5 G/DL (ref 12–16)
IMM GRANULOCYTES # BLD AUTO: 0.01 K/UL (ref 0–0.11)
IMM GRANULOCYTES NFR BLD AUTO: 0.2 % (ref 0–0.9)
KETONES UR STRIP.AUTO-MCNC: NEGATIVE MG/DL
LEUKOCYTE ESTERASE UR QL STRIP.AUTO: NEGATIVE
LYMPHOCYTES # BLD AUTO: 1.4 K/UL (ref 1–4.8)
LYMPHOCYTES NFR BLD: 32.3 % (ref 22–41)
MCH RBC QN AUTO: 32.3 PG (ref 27–33)
MCHC RBC AUTO-ENTMCNC: 34 G/DL (ref 33.6–35)
MCV RBC AUTO: 94.9 FL (ref 81.4–97.8)
MICRO URNS: NORMAL
MONOCYTES # BLD AUTO: 0.21 K/UL (ref 0–0.85)
MONOCYTES NFR BLD AUTO: 4.8 % (ref 0–13.4)
NEUTROPHILS # BLD AUTO: 2.48 K/UL (ref 2–7.15)
NEUTROPHILS NFR BLD: 57.4 % (ref 44–72)
NITRITE UR QL STRIP.AUTO: NEGATIVE
NRBC # BLD AUTO: 0 K/UL
NRBC BLD AUTO-RTO: 0 /100 WBC
PH UR STRIP.AUTO: 7.5 [PH]
PLATELET # BLD AUTO: 236 K/UL (ref 164–446)
PMV BLD AUTO: 10.3 FL (ref 9–12.9)
POTASSIUM SERPL-SCNC: 4.1 MMOL/L (ref 3.6–5.5)
PROT SERPL-MCNC: 7.5 G/DL (ref 6–8.2)
PROT UR QL STRIP: NEGATIVE MG/DL
RBC # BLD AUTO: 4.49 M/UL (ref 4.2–5.4)
RBC UR QL AUTO: NEGATIVE
RH BLD: NORMAL
SODIUM SERPL-SCNC: 138 MMOL/L (ref 135–145)
SP GR UR STRIP.AUTO: 1.01
UROBILINOGEN UR STRIP.AUTO-MCNC: 0.2 MG/DL
WBC # BLD AUTO: 4.3 K/UL (ref 4.8–10.8)

## 2017-08-22 PROCEDURE — 86901 BLOOD TYPING SEROLOGIC RH(D): CPT

## 2017-08-22 PROCEDURE — 81003 URINALYSIS AUTO W/O SCOPE: CPT

## 2017-08-22 PROCEDURE — 86900 BLOOD TYPING SEROLOGIC ABO: CPT

## 2017-08-22 PROCEDURE — 86850 RBC ANTIBODY SCREEN: CPT

## 2017-08-22 PROCEDURE — 85025 COMPLETE CBC W/AUTO DIFF WBC: CPT

## 2017-08-22 PROCEDURE — 84703 CHORIONIC GONADOTROPIN ASSAY: CPT

## 2017-08-22 PROCEDURE — 36415 COLL VENOUS BLD VENIPUNCTURE: CPT

## 2017-08-22 PROCEDURE — 80053 COMPREHEN METABOLIC PANEL: CPT

## 2017-08-24 ENCOUNTER — HOSPITAL ENCOUNTER (OUTPATIENT)
Facility: MEDICAL CENTER | Age: 42
End: 2017-08-24
Attending: OBSTETRICS & GYNECOLOGY | Admitting: OBSTETRICS & GYNECOLOGY
Payer: COMMERCIAL

## 2017-08-24 VITALS
OXYGEN SATURATION: 98 % | TEMPERATURE: 97.8 F | HEIGHT: 64 IN | WEIGHT: 119.71 LBS | RESPIRATION RATE: 16 BRPM | HEART RATE: 85 BPM | DIASTOLIC BLOOD PRESSURE: 71 MMHG | SYSTOLIC BLOOD PRESSURE: 123 MMHG | BODY MASS INDEX: 20.44 KG/M2

## 2017-08-24 PROBLEM — R10.2 PELVIC PAIN: Status: ACTIVE | Noted: 2017-08-24

## 2017-08-24 LAB — ABO GROUP BLD: NORMAL

## 2017-08-24 PROCEDURE — 700102 HCHG RX REV CODE 250 W/ 637 OVERRIDE(OP): Performed by: OBSTETRICS & GYNECOLOGY

## 2017-08-24 PROCEDURE — 501574 HCHG TROCAR, SMTH CAN&SEAL 5: Performed by: OBSTETRICS & GYNECOLOGY

## 2017-08-24 PROCEDURE — 160035 HCHG PACU - 1ST 60 MINS PHASE I: Performed by: OBSTETRICS & GYNECOLOGY

## 2017-08-24 PROCEDURE — 500868 HCHG NEEDLE, SURGI(VARES): Performed by: OBSTETRICS & GYNECOLOGY

## 2017-08-24 PROCEDURE — A4338 INDWELLING CATHETER LATEX: HCPCS | Performed by: OBSTETRICS & GYNECOLOGY

## 2017-08-24 PROCEDURE — 160041 HCHG SURGERY MINUTES - EA ADDL 1 MIN LEVEL 4: Performed by: OBSTETRICS & GYNECOLOGY

## 2017-08-24 PROCEDURE — 160048 HCHG OR STATISTICAL LEVEL 1-5: Performed by: OBSTETRICS & GYNECOLOGY

## 2017-08-24 PROCEDURE — 88305 TISSUE EXAM BY PATHOLOGIST: CPT

## 2017-08-24 PROCEDURE — 502704 HCHG DEVICE, LIGASURE IMPACT: Performed by: OBSTETRICS & GYNECOLOGY

## 2017-08-24 PROCEDURE — 700111 HCHG RX REV CODE 636 W/ 250 OVERRIDE (IP)

## 2017-08-24 PROCEDURE — 500886 HCHG PACK, LAPAROSCOPY: Performed by: OBSTETRICS & GYNECOLOGY

## 2017-08-24 PROCEDURE — 500123 HCHG BOVIE, CONTROL W/BLADE: Performed by: OBSTETRICS & GYNECOLOGY

## 2017-08-24 PROCEDURE — 160002 HCHG RECOVERY MINUTES (STAT): Performed by: OBSTETRICS & GYNECOLOGY

## 2017-08-24 PROCEDURE — 501330 HCHG SET, CYSTO IRRIG TUBING: Performed by: OBSTETRICS & GYNECOLOGY

## 2017-08-24 PROCEDURE — 36415 COLL VENOUS BLD VENIPUNCTURE: CPT

## 2017-08-24 PROCEDURE — 501399 HCHG SPECIMAN BAG, ENDO CATC: Performed by: OBSTETRICS & GYNECOLOGY

## 2017-08-24 PROCEDURE — 501838 HCHG SUTURE GENERAL: Performed by: OBSTETRICS & GYNECOLOGY

## 2017-08-24 PROCEDURE — 700101 HCHG RX REV CODE 250

## 2017-08-24 PROCEDURE — A9270 NON-COVERED ITEM OR SERVICE: HCPCS | Performed by: OBSTETRICS & GYNECOLOGY

## 2017-08-24 PROCEDURE — 501586 HCHG TROCAR, THRD SPIKE 5X55: Performed by: OBSTETRICS & GYNECOLOGY

## 2017-08-24 PROCEDURE — 160036 HCHG PACU - EA ADDL 30 MINS PHASE I: Performed by: OBSTETRICS & GYNECOLOGY

## 2017-08-24 PROCEDURE — A6402 STERILE GAUZE <= 16 SQ IN: HCPCS | Performed by: OBSTETRICS & GYNECOLOGY

## 2017-08-24 PROCEDURE — 160029 HCHG SURGERY MINUTES - 1ST 30 MINS LEVEL 4: Performed by: OBSTETRICS & GYNECOLOGY

## 2017-08-24 PROCEDURE — 160009 HCHG ANES TIME/MIN: Performed by: OBSTETRICS & GYNECOLOGY

## 2017-08-24 RX ORDER — SIMETHICONE 80 MG
80 TABLET,CHEWABLE ORAL EVERY 8 HOURS PRN
Status: DISCONTINUED | OUTPATIENT
Start: 2017-08-24 | End: 2017-08-24 | Stop reason: HOSPADM

## 2017-08-24 RX ORDER — ACETAMINOPHEN 500 MG
1000 TABLET ORAL EVERY 6 HOURS
Status: DISCONTINUED | OUTPATIENT
Start: 2017-08-24 | End: 2017-08-24 | Stop reason: HOSPADM

## 2017-08-24 RX ORDER — SODIUM CHLORIDE 9 MG/ML
INJECTION, SOLUTION INTRAVENOUS CONTINUOUS
Status: DISCONTINUED | OUTPATIENT
Start: 2017-08-24 | End: 2017-08-24 | Stop reason: HOSPADM

## 2017-08-24 RX ORDER — OXYCODONE HYDROCHLORIDE 5 MG/1
5 TABLET ORAL
Status: DISCONTINUED | OUTPATIENT
Start: 2017-08-24 | End: 2017-08-24 | Stop reason: HOSPADM

## 2017-08-24 RX ORDER — METOCLOPRAMIDE HYDROCHLORIDE 5 MG/ML
10 INJECTION INTRAMUSCULAR; INTRAVENOUS EVERY 4 HOURS PRN
Status: DISCONTINUED | OUTPATIENT
Start: 2017-08-24 | End: 2017-08-24 | Stop reason: HOSPADM

## 2017-08-24 RX ORDER — IBUPROFEN 400 MG/1
800 TABLET ORAL
Status: DISCONTINUED | OUTPATIENT
Start: 2017-08-24 | End: 2017-08-24 | Stop reason: HOSPADM

## 2017-08-24 RX ORDER — ONDANSETRON 2 MG/ML
4 INJECTION INTRAMUSCULAR; INTRAVENOUS EVERY 6 HOURS PRN
Status: DISCONTINUED | OUTPATIENT
Start: 2017-08-24 | End: 2017-08-24 | Stop reason: HOSPADM

## 2017-08-24 RX ORDER — IBUPROFEN 800 MG/1
800 TABLET ORAL
Qty: 30 TAB | Refills: 1 | Status: SHIPPED | OUTPATIENT
Start: 2017-08-24 | End: 2018-05-31

## 2017-08-24 RX ORDER — OXYCODONE HYDROCHLORIDE 5 MG/1
5 TABLET ORAL
Qty: 20 TAB | Refills: 0 | Status: SHIPPED | OUTPATIENT
Start: 2017-08-24 | End: 2018-05-31

## 2017-08-24 RX ORDER — DIPHENHYDRAMINE HYDROCHLORIDE 50 MG/ML
INJECTION INTRAMUSCULAR; INTRAVENOUS
Status: COMPLETED
Start: 2017-08-24 | End: 2017-08-24

## 2017-08-24 RX ORDER — OXYCODONE HYDROCHLORIDE 5 MG/1
10 TABLET ORAL
Status: DISCONTINUED | OUTPATIENT
Start: 2017-08-24 | End: 2017-08-24 | Stop reason: HOSPADM

## 2017-08-24 RX ORDER — ONDANSETRON 2 MG/ML
INJECTION INTRAMUSCULAR; INTRAVENOUS
Status: COMPLETED
Start: 2017-08-24 | End: 2017-08-24

## 2017-08-24 RX ORDER — BUPIVACAINE HYDROCHLORIDE AND EPINEPHRINE 2.5; 5 MG/ML; UG/ML
INJECTION, SOLUTION EPIDURAL; INFILTRATION; INTRACAUDAL; PERINEURAL
Status: DISCONTINUED | OUTPATIENT
Start: 2017-08-24 | End: 2017-08-24 | Stop reason: HOSPADM

## 2017-08-24 RX ADMIN — HYDROMORPHONE HYDROCHLORIDE 0.5 MG: 1 INJECTION, SOLUTION INTRAMUSCULAR; INTRAVENOUS; SUBCUTANEOUS at 14:20

## 2017-08-24 RX ADMIN — ONDANSETRON 4 MG: 2 INJECTION INTRAMUSCULAR; INTRAVENOUS at 14:30

## 2017-08-24 RX ADMIN — SODIUM CHLORIDE: 9 INJECTION, SOLUTION INTRAVENOUS at 12:15

## 2017-08-24 RX ADMIN — DIPHENHYDRAMINE HYDROCHLORIDE 12.5 MG: 50 INJECTION INTRAMUSCULAR; INTRAVENOUS at 14:35

## 2017-08-24 ASSESSMENT — PAIN SCALES - GENERAL
PAINLEVEL_OUTOF10: 3
PAINLEVEL_OUTOF10: 3
PAINLEVEL_OUTOF10: 5
PAINLEVEL_OUTOF10: 3
PAINLEVEL_OUTOF10: 3
PAINLEVEL_OUTOF10: 0
PAINLEVEL_OUTOF10: 0
PAINLEVEL_OUTOF10: 3

## 2017-08-24 NOTE — OR SURGEON
Operative Report    PreOp Diagnosis: pelvic pain, dysmenorrhea    PostOp Diagnosis: same plus endometriosis of rt ovary, small endo in posterior CDS and rt peritoneum    Procedure(s):  PELVISCOPY- DIAGNOSTIC WITH FULGURATION OF ENDOMETRIOSIS - Wound Class: Clean Contaminated  SALPINGECTOMY-OOPHERECTOMY - Wound Class: Clean Contaminated    Surgeon(s):  LUKAS Jack M.D.    Anesthesiologist/Type of Anesthesia:  Anesthesiologist: Asad Kendrick D.O./General    Surgical Staff:  Circulator: Erica Morfin R.N.  Scrub Person: Irasema Mariano    Specimens:  Rt ovary and ft    Estimated Blood Loss: <25ml    Findings: no adhesions, small retroverted uterus, normal left ovart and ft, rt ovary with some spots of endometriosis, posterior culdesac with small start of endo, also seen along rt pelvic sidewall.    Complications: none        8/24/2017 2:04 PM Jolie Mclean

## 2017-08-24 NOTE — H&P
DATE OF ADMISSION:  2017    IDENTIFICATION:  This is a 42-year-old  2, para 3 female, who is being   admitted for diagnostic laparoscopy for pelvic pain with possible LAVH-RSO and   possible bilateral salpingo-oophorectomy.    HISTORY OF PRESENT ILLNESS:  Patient is well known to me.  I have taken care   of her for the past 10 years.  She has a complicated medical history.  She   does have a history of 1 vaginal delivery followed by a  for twins.    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  Her medical history is   significant for a recent appendectomy.  This happened in 2016.  It was   complicated by readmission for a 10 cm abscess requiring drainage.  Since that   time, she has had constant pain and pressure between her rectum and vagina.    It is worse when she gets her period, which is 3 days of black spotting, as   she has a history of a NovaSure endometrial ablation.  She has seen her   surgeon and a CAT scan was negative.  We are concerned today about   endometriosis versus bowel adhesions.  I have scheduled this surgery with Dr. Michele, who will assist me today.    OTHER ISSUES:  She has a history of hip surgery, one , appendectomy,   a tubal ligation, and she also has a spinal cord stimulator.    PAST MEDICAL HISTORY:  She has a history of depression, intestinal problems,   LEEP, migraines.    MEDICATIONS:  None currently.  She does have a prescription for Percocet that   she takes occasionally for pain through the spinal cord stimulator.    SOCIAL HISTORY:  She is .  She stays at home with her kids, does home   schooling and cares for their needs.  One of her daughters still has a feeding   tube.  She denies the use of tobacco or alcohol.  She exercises regularly.    PHYSICAL EXAMINATION:  VITAL SIGNS:  Blood pressure 122/84.  Weight is 125 pounds.  GENERAL:  She is  pleasant, cooperative, and appears her stated age.  HEART:  Regular rate and rhythm.  LUNGS:  Clear to auscultation bilaterally.  ABDOMEN:  Soft, nondistended, nontender, nongravid uterus.  PELVIC:  Normal-appearing external female genitalia, normal appearance to the   vagina, which is well rugated.  Cervix is normal.  On bimanual examination,   uterus and towards the right are mildly tender on palpation.  No masses are   palpated.  Left adnexa are normal.    IMAGING STUDIES:  Recent ultrasound revealed a normal size uterus, normal   appearance to the ovaries bilaterally.    ASSESSMENT AND PLAN:  A 42-year-old  2, para 3 female with pelvic pain,   dysmenorrhea that is worsened since an appendectomy that was complicated by   an abscess that was 1 year ago.  We are planning to do a diagnostic   laparoscopy with likely lysis of adhesions, possible ablation of   endometriosis, likely removal of the right ovary  at minimum, possible   hysterectomy if she has multiple areas of endometriosis.  We will try to keep   at least the left ovary in place, so that she does not have to do hormone   replacement, but patient desires everything to be removed if there is   extensive endometriosis or scar tissue throughout her pelvis.  Discussed the   surgery in detail with the patient, discussed the risks of bleeding leading to   blood transfusion, infection requiring IV antibiotics, damage to internal   organs including her bowel, bladder, ureters, blood vessels, nerves.  We   discussed the consequences of those injuries including more surgery, longer   hospitalization, and long term sequelae with long-term disability and possibly   even death.  Patient voiced her understanding of these.  She was given an   opportunity to ask questions.  These were answered to her satisfaction.       ____________________________________     MD KLARISSA Tsai / CHEYANNE    DD:  2017 08:52:52  DT:  2017 10:04:53    D#:  [id number]   Job#:  [id number]

## 2017-08-24 NOTE — OP REPORT
DATE OF SERVICE:  08/24/2017    DATE OF OPERATION:  08/02/2017    PREOPERATIVE DIAGNOSES:  1.  Pelvic pain.  2.  Dysmenorrhea.  3.  History of appendiceal abscess.    POSTOPERATIVE DIAGNOSES:  1.  Pelvic pain.  2.  Dysmenorrhea.  3.  History of appendiceal abscess.  4.  Endometriosis of right ovary, small areas of endometriosis in the   posterior cul-de-sac, anterior bladder and right pelvic peritoneal sidewall.    PROCEDURE:  Diagnostic laparoscopy with fulguration of endometriosis and right   salpingo-oophorectomy.    SURGEON:  Jolie Mclean MD    ASSISTANT:  Dr. Michele.    ANESTHESIOLOGIST:  Asad Kendrick DO.    ANESTHESIA:  General.    SPECIMEN:  Right ovary and fallopian tube.    ESTIMATED BLOOD LOSS:  Less than 25 mL    FINDINGS:  No bowel adhesions, a small retroverted uterus with normal left   ovary and fallopian tube, right ovary with some spots of endometriosis.    Posterior cul-de-sac was small spots of endometriosis as well as a right   pelvic sidewall and one spot of Endo along the bladder peritoneum.    COMPLICATIONS:  None.    PROCEDURE NOTE:  After informed consent was obtained, patient was taken to the   operating room where she was given general anesthesia and intubated.  She was   placed in lithotomy position in well-padded De stirrups.  She was   sterilely prepped and draped.  Slade catheter was placed into her bladder.  A   speculum was placed into the vagina.  Cervix was visualized and grasped with   single tooth tenaculum.  Sue cannula was placed through the cervical os.    Speculum was removed, attention was turned to her upper abdomen.  Dr. Michele   got into the abdomen and incision was made after injecting local and just   below the umbilicus.  A 1 cm incision was made.  This was taken down to the   level of the fascia.  The fascia was grasped and incised.  A 2-0 Vicryl   sutures were placed intact.  Peritoneum was then entered bluntly.  Randall   trocar was placed.   Pneumoperitoneum was created and _____ camera was placed.    Site of entry was examined and found to be hemostatic.  Survey of the lower   and upper abdomen was performed and was normal.  There was no evidence of   adhesions or abnormality.  The patient was placed in Trendelenburg where the   pelvis was visualized with findings as noted above.  We switched to an   operating scope and I performed a right salpingo-oophorectomy using the   LigaSure device.  We took down the IP ligament and then came across the broad   ligament a serial fashion, clamping, cauterizing and transecting along the   way.  Once the specimen was freed, it was placed in the right anterior   cul-de-sac was switched.  The camera was removed from the umbilical port and   it was 5 mm camera was placed through the suprapubic port, which had been   placed under direct visualization earlier in the case.  The ovary was placed   in an EndoCatch bag and brought out through the umbilicus.  The Randall was   replaced.  The camera was again placed through the umbilical incision.    Irrigation was performed of the pelvis, at which time I noted very small areas   of Endo in the posterior cul-de-sac.  These were cauterized with the LigaSure   device and also along the right pelvic sidewall and one spot along the   bladder peritoneum was gently cauterized.  Procedure was then felt to be   completed.  The suprapubic port was removed under direct visualization.  There   was no evidence of bleeding.  The Randall was then removed.  Pneumoperitoneum   was released.  Fascia was brought together with the existing sutures in place   using 0 Vicryl.  Skin was closed in a subcuticular fashion using 4-0 Vicryl.    Instruments were then removed from the vagina.  A speculum was placed back   into the vagina.  Tenaculum sites were inspected and found to be hemostatic.    Speculum was removed.  Patient was placed back in supine position, was awoken   and then taken to recovery  room in stable condition.       ____________________________________     MD KLARISSA Tsai / CHEYANNE    DD:  08/24/2017 14:13:47  DT:  08/24/2017 16:23:23    D#:  [id number]  Job#:  [id number]

## 2017-08-24 NOTE — DISCHARGE INSTRUCTIONS
ACTIVITY: Rest and take it easy for the first 24 hours.  A responsible adult is recommended to remain with you during that time.  It is normal to feel sleepy.  We encourage you to not do anything that requires balance, judgment or coordination.    MILD FLU-LIKE SYMPTOMS ARE NORMAL. YOU MAY EXPERIENCE GENERALIZED MUSCLE ACHES, THROAT IRRITATION, HEADACHE AND/OR SOME NAUSEA.    FOR 24 HOURS DO NOT:  Drive, operate machinery or run household appliances.  Drink beer or alcoholic beverages.   Make important decisions or sign legal documents.    SPECIAL INSTRUCTIONS: *SEE PELVISCOPY INSTRUCTION SHEET**    DIET: To avoid nausea, slowly advance diet as tolerated, avoiding spicy or greasy foods for the first day.  Add more substantial food to your diet according to your physician's instructions.  Babies can be fed formula or breast milk as soon as they are hungry.  INCREASE FLUIDS AND FIBER TO AVOID CONSTIPATION.    SURGICAL DRESSING/BATHING: *MAY SHOWER TOMORROW.  NO TUB BATHS, HOT TUBS OR SWIMMING UNTIL PHYSICIAN AUTHORIZES IT. *    FOLLOW-UP APPOINTMENT:  A follow-up appointment should be arranged with your doctor in *FOLLOW  UP WITH DR. CEDILLO  **; call to schedule.    You should CALL YOUR PHYSICIAN if you develop:  Fever greater than 101 degrees F.  Pain not relieved by medication, or persistent nausea or vomiting.  Excessive bleeding (blood soaking through dressing) or unexpected drainage from the wound.  Extreme redness or swelling around the incision site, drainage of pus or foul smelling drainage.  Inability to urinate or empty your bladder within 8 hours.  Problems with breathing or chest pain.    You should call 911 if you develop problems with breathing or chest pain.  If you are unable to contact your doctor or surgical center, you should go to the nearest emergency room or urgent care center.  Physician's telephone #: **[phone]*    If any questions arise, call your doctor.  If your doctor is not available,  please feel free to call the Surgical Center at [phone].  The Center is open Monday through Friday from 7AM to 7PM.  You can also call the HEALTH HOTLINE open 24 hours/day, 7 days/week and speak to a nurse at [phone], or toll free at [phone].    A registered nurse may call you a few days after your surgery to see how you are doing after your procedure.    MEDICATIONS: Resume taking daily medication.  Take prescribed pain medication with food.  If no medication is prescribed, you may take non-aspirin pain medication if needed.  PAIN MEDICATION CAN BE VERY CONSTIPATING.  Take a stool softener or laxative such as senokot, pericolace, or milk of magnesia if needed.    Prescription given for *DILAUDID, MOTRIN AND TYLENOL**.  Last pain medication given at *______________________**.    If your physician has prescribed pain medication that includes Acetaminophen (Tylenol), do not take additional Acetaminophen (Tylenol) while taking the prescribed medication.    Depression / Suicide Risk    As you are discharged from this [facility] facility, it is important to learn how to keep safe from harming yourself.    Recognize the warning signs:  · Abrupt changes in personality, positive or negative- including increase in energy   · Giving away possessions  · Change in eating patterns- significant weight changes-  positive or negative  · Change in sleeping patterns- unable to sleep or sleeping all the time   · Unwillingness or inability to communicate  · Depression  · Unusual sadness, discouragement and loneliness  · Talk of wanting to die  · Neglect of personal appearance   · Rebelliousness- reckless behavior  · Withdrawal from people/activities they love  · Confusion- inability to concentrate     If you or a loved one observes any of these behaviors or has concerns about self-harm, here's what you can do:  · Talk about it- your feelings and reasons for harming yourself  · Remove any means that you might use  to hurt yourself (examples: pills, rope, extension cords, firearm)  · Get professional help from the community (Mental Health, Substance Abuse, psychological counseling)  · Do not be alone:Call your Safe Contact- someone whom you trust who will be there for you.  · Call your local CRISIS HOTLINE 832-8483 or 520-491-2401  · Call your local Children's Mobile Crisis Response Team [place] (849) 504-8391 or www.[employer]  · Call the toll free National Suicide Prevention Hotlines   · National Suicide Prevention Lifeline 252-232-NDHN (2697)  · National Hope Line Network 800-SUICIDE (376-9682)

## 2017-08-24 NOTE — OR NURSING
1523 Report from Ani FINK.   1530 Discharge instructions reviewed with patient and .  Answered all questions and concerns. Pt pain well controlled, denies nausea, resting comfortably.   1600 Pt up to bathroom to void and change clothes.  Tolerated well.   1625 Pt feels ready to go home, meets discharge criteria.  PIV removed, pt tolerated well.   1630 Pt left via wheelchair with  to d/c home.

## 2017-08-24 NOTE — H&P
DATE OF ADMISSION:  2017    IDENTIFICATION:  This is a 42-year-old  2, para 3 female, who is being   admitted for diagnostic laparoscopy for pelvic pain with possible LAVH-RSO and   possible bilateral salpingo-oophorectomy.    HISTORY OF PRESENT ILLNESS:  Patient is well known to me.  I have taken care   of her for the past 10 years.  She has a complicated medical history.  She   does have a history of 1 vaginal delivery followed by a  for twins.    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  Her medical history is   significant for a recent appendectomy.  This happened in 2016.  It was   complicated by readmission for a 10 cm abscess requiring drainage.  Since that   time, she has had constant pain and pressure between her rectum and vagina.    It is worse when she gets her period, which is 3 days of black spotting, as   she has a history of a NovaSure endometrial ablation.  She has seen her   surgeon and a CAT scan was negative.  We are concerned today about   endometriosis versus bowel adhesions.  I have scheduled this surgery with Dr. Michele, who will assist me today.    OTHER ISSUES:  She has a history of hip surgery, one , appendectomy,   a tubal ligation, and she also has a spinal cord stimulator.    PAST MEDICAL HISTORY:  She has a history of depression, intestinal problems,   LEEP, migraines.    MEDICATIONS:  None currently.  She does have a prescription for Percocet that   she takes occasionally for pain through the spinal cord stimulator.    SOCIAL HISTORY:  She is .  She stays at home with her kids, does home   schooling and cares for their needs.  One of her daughters still has a feeding   tube.  She denies the use of tobacco or alcohol.  She exercises regularly.    PHYSICAL EXAMINATION:  VITAL SIGNS:  Blood pressure 122/84.  Weight is 125 pounds.  GENERAL:  She is  pleasant, cooperative, and appears her stated age.  HEART:  Regular rate and rhythm.  LUNGS:  Clear to auscultation bilaterally.  ABDOMEN:  Soft, nondistended, nontender, nongravid uterus.  PELVIC:  Normal-appearing external female genitalia, normal appearance to the   vagina, which is well rugated.  Cervix is normal.  On bimanual examination,   uterus and towards the right are mildly tender on palpation.  No masses are   palpated.  Left adnexa are normal.    IMAGING STUDIES:  Recent ultrasound revealed a normal size uterus, normal   appearance to the ovaries bilaterally.    ASSESSMENT AND PLAN:  A 42-year-old  2, para 3 female with pelvic pain,   dysmenorrhea that is worsened since an appendectomy that was complicated by   an abscess that was 1 year ago.  We are planning to do a diagnostic   laparoscopy with likely lysis of adhesions, possible ablation of   endometriosis, likely removal of the right ovary  at minimum, possible   hysterectomy if she has multiple areas of endometriosis.  We will try to keep   at least the left ovary in place, so that she does not have to do hormone   replacement, but patient desires everything to be removed if there is   extensive endometriosis or scar tissue throughout her pelvis.  Discussed the   surgery in detail with the patient, discussed the risks of bleeding leading to   blood transfusion, infection requiring IV antibiotics, damage to internal   organs including her bowel, bladder, ureters, blood vessels, nerves.  We   discussed the consequences of those injuries including more surgery, longer   hospitalization, and long term sequelae with long-term disability and possibly   even death.  Patient voiced her understanding of these.  She was given an   opportunity to ask questions.  These were answered to her satisfaction.       ____________________________________     MD KLARISSA Tsai / CHEYANNE    DD:  2017 08:52:52  DT:  2017 10:04:53    D#:  [id number]   Job#:  [id number]

## 2017-08-24 NOTE — IP AVS SNAPSHOT
" Home Care Instructions                                                                                                                Name:Linn Mcconnell  Medical Record Number:[id number]  CSN: [id number]    YOB: 1975   Age: 42 y.o.  Sex: female  HT:1.626 m (5' 4.02\") WT: 54.3 kg (119 lb 11.4 oz)          Admit Date: 8/24/2017     Discharge Date:   Today's Date: 8/24/2017  Attending Doctor:  Jolie Cedillo M.D.                  Allergies:  Fentanyl; Flagyl; Lactated ringers; Norco; Percocet; Phenergan; Scopolamine; and Vicodin              Follow-up Information     1. Follow up with Jolie Cedillo M.D. In 2 weeks.    Specialty:  OB/Gyn    Contact information    [address]  [facility] [address]  [phone]          Discharge Instructions         ACTIVITY: Rest and take it easy for the first 24 hours.  A responsible adult is recommended to remain with you during that time.  It is normal to feel sleepy.  We encourage you to not do anything that requires balance, judgment or coordination.    MILD FLU-LIKE SYMPTOMS ARE NORMAL. YOU MAY EXPERIENCE GENERALIZED MUSCLE ACHES, THROAT IRRITATION, HEADACHE AND/OR SOME NAUSEA.    FOR 24 HOURS DO NOT:  Drive, operate machinery or run household appliances.  Drink beer or alcoholic beverages.   Make important decisions or sign legal documents.    SPECIAL INSTRUCTIONS: *SEE PELVISCOPY INSTRUCTION SHEET**    DIET: To avoid nausea, slowly advance diet as tolerated, avoiding spicy or greasy foods for the first day.  Add more substantial food to your diet according to your physician's instructions.  Babies can be fed formula or breast milk as soon as they are hungry.  INCREASE FLUIDS AND FIBER TO AVOID CONSTIPATION.    SURGICAL DRESSING/BATHING: *MAY SHOWER TOMORROW.  NO TUB BATHS, HOT TUBS OR SWIMMING UNTIL PHYSICIAN AUTHORIZES IT. *    FOLLOW-UP APPOINTMENT:  A follow-up appointment should be arranged with your doctor in *FOLLOW  UP WITH DR. CEDILLO  **; " call to schedule.    You should CALL YOUR PHYSICIAN if you develop:  Fever greater than 101 degrees F.  Pain not relieved by medication, or persistent nausea or vomiting.  Excessive bleeding (blood soaking through dressing) or unexpected drainage from the wound.  Extreme redness or swelling around the incision site, drainage of pus or foul smelling drainage.  Inability to urinate or empty your bladder within 8 hours.  Problems with breathing or chest pain.    You should call 911 if you develop problems with breathing or chest pain.  If you are unable to contact your doctor or surgical center, you should go to the nearest emergency room or urgent care center.  Physician's telephone #: **[phone]*    If any questions arise, call your doctor.  If your doctor is not available, please feel free to call the Surgical Center at [phone].  The Center is open Monday through Friday from 7AM to 7PM.  You can also call the [employer] HOTLINE open 24 hours/day, 7 days/week and speak to a nurse at [phone], or toll free at [phone].    A registered nurse may call you a few days after your surgery to see how you are doing after your procedure.    MEDICATIONS: Resume taking daily medication.  Take prescribed pain medication with food.  If no medication is prescribed, you may take non-aspirin pain medication if needed.  PAIN MEDICATION CAN BE VERY CONSTIPATING.  Take a stool softener or laxative such as senokot, pericolace, or milk of magnesia if needed.    Prescription given for *ROXICODONE AND MOTRIN**.  Last pain medication given at *______________________**.    If your physician has prescribed pain medication that includes Acetaminophen (Tylenol), do not take additional Acetaminophen (Tylenol) while taking the prescribed medication.    Depression / Suicide Risk    As you are discharged from this [facility] facility, it is important to learn how to keep safe from harming yourself.    Recognize the warning  signs:  · Abrupt changes in personality, positive or negative- including increase in energy   · Giving away possessions  · Change in eating patterns- significant weight changes-  positive or negative  · Change in sleeping patterns- unable to sleep or sleeping all the time   · Unwillingness or inability to communicate  · Depression  · Unusual sadness, discouragement and loneliness  · Talk of wanting to die  · Neglect of personal appearance   · Rebelliousness- reckless behavior  · Withdrawal from people/activities they love  · Confusion- inability to concentrate     If you or a loved one observes any of these behaviors or has concerns about self-harm, here's what you can do:  · Talk about it- your feelings and reasons for harming yourself  · Remove any means that you might use to hurt yourself (examples: pills, rope, extension cords, firearm)  · Get professional help from the community (Mental Health, Substance Abuse, psychological counseling)  · Do not be alone:Call your Safe Contact- someone whom you trust who will be there for you.  · Call your local CRISIS HOTLINE 817-6489 or 413-172-2252  · Call your local Children's Mobile Crisis Response Team [place] (358) 104-2103 or www.[employer]  · Call the toll free National Suicide Prevention Hotlines   · National Suicide Prevention Lifeline 683-627-MROE (5281)  · National Hope Line Network 800-SUICIDE (698-4063)       Medication List      START taking these medications        Instructions    Morning Afternoon Evening Bedtime    ibuprofen 800 MG Tabs   Commonly known as:  MOTRIN        Take 1 Tab by mouth 3 times a day, with meals.   Dose:  800 mg                        oxycodone immediate-release 5 MG Tabs   Commonly known as:  ROXICODONE        Take 1 Tab by mouth every 3 hours as needed (Moderate Pain (NRS Pain Scale 4-6; CPOT Pain Scale 3-5)).   Dose:  5 mg                          CONTINUE taking these medications        Instructions    Morning Afternoon  Evening Bedtime    VITAMIN B COMPLEX PO        Take 1 Tab by mouth every day.   Dose:  1 Tab                        Vitamin C 1000 MG Tabs        Take 3,000 mg by mouth every day.   Dose:  3000 mg                        VITAMIN D PO        Take 1,000 Units by mouth every morning.   Dose:  1000 Units                        WELLBUTRIN PO        Take  by mouth every morning. Alternates 150 mg and 300 mg                             Where to Get Your Medications      You can get these medications from any pharmacy     Bring a paper prescription for each of these medications    - ibuprofen 800 MG Tabs  - oxycodone immediate-release 5 MG Tabs            Medication Information     Above and/or attached are the medications your physician expects you to take upon discharge. Review all of your home medications and newly ordered medications with your doctor and/or pharmacist. Follow medication instructions as directed by your doctor and/or pharmacist. Please keep your medication list with you and share with your physician. Update the information when medications are discontinued, doses are changed, or new medications (including over-the-counter products) are added; and carry medication information at all times in the event of emergency situations.        Resources     Quit Smoking / Tobacco Use:    I understand the use of any tobacco products increases my chance of suffering from future heart disease or stroke and could cause other illnesses which may shorten my life. Quitting the use of tobacco products is the single most important thing I can do to improve my health. For further information on smoking / tobacco cessation call a Toll Free Quit Line at 1-955.755.4653 (*National Cancer [place]) or 1-329.660.4632 (American Lung Association) or you can access the web based program at www.lungusa.org.    [place] Tobacco Users Help Line:  (542) 597-8804       Toll Free: 1-257.650.8394  Quit Tobacco Program [facility]  Services (983)938-0767    Crisis Hotline:    National Crisis Hotline:  7-414-AIZEOHT or 1-974.717.1534    [place] Crisis Hotline:    1-893.219.7629 or 413-642-3162    Discharge Survey:   Thank you for choosing [facility]. We hope we did everything we could to make your hospital stay a pleasant one. You may be receiving a survey and we would appreciate your time and participation in answering the questions. Your input is very valuable to us in our efforts to improve our service to our patients and their families.            Signatures     My signature on this form indicates that:    1. I acknowledge receipt and understanding of these Home Care Instruction.  2. My questions regarding this information have been answered to my satisfaction.  3. I have formulated a plan with my discharge nurse to obtain my prescribed medications for home.    __________________________________      __________________________________                   Patient Signature                                 Guardian/Responsible Adult Signature      __________________________________                 __________       ________                       Nurse Signature                                               Date                 Time

## 2017-08-24 NOTE — OR NURSING
1413  RECEIVED PATIENT FROM OR.  REPORT FROM DR. BEAR.  NO AIRWAY IN PLACE.  RESPIRATIONS ARE EVEN AND UNLABORED.  PATIENT DENIES PAIN.  SUPRA PUBIC GAUZE AND TEGADERM ARE CDI.  UMBILICAL GAUZE AND TEGADERM WITH SMALL AMOUNT OF BLOODY DRAINAGE NOTED.      1420  MEDICATED WITH IV DILAUDID FOR C/O ABDOMINAL PAIN 5.    1430  MEDICATED WITH IV ZOFRAN FOR C/O NAUSEA.    1435  MEDICATED WITH IV BENADRY FOR C/O OF NAUSEA.  PATIENT STATES ZOFRAN AND BENADRYL TOGETHER WORK BEST.       1501  DR. THOMPSON CALLED.  PATIENT STATES SHE CAN NOT TAKE OXYCODONE.  PRESCRIPTION FOR DILAUDID GIVEN TO PATIENT.    1523  REPORT TO AUDRA FINK.

## 2017-08-24 NOTE — IP AVS SNAPSHOT
8/24/2017    Linn Mcconnell  [address]  [place] [address]    Dear Linn:    [facility] wants to ensure your discharge home is safe and you or your loved ones have had all of your questions answered regarding your care after you leave the hospital.    Below is a list of resources and contact information should you have any questions regarding your hospital stay, follow-up instructions, or active medical symptoms.    Questions or Concerns Regarding… Contact   Medical Questions Related to Your Discharge  (7 days a week, 8am-5pm) Contact a Nurse Care Coordinator   [phone]   Medical Questions Not Related to Your Discharge  (24 hours a day / 7 days a week)  Contact the Nurse Health Line   [phone]    Medications or Discharge Instructions Refer to your discharge packet   or contact your [facility] Primary Care Provider   [phone]   Follow-up Appointment(s) Schedule your appointment via [email or web address]   or contact Scheduling [phone]   Billing Review your statement via [email or web address]  or contact Billing [phone]   Medical Records Review your records via [email or web address]   or contact Medical Records [phone]     You may receive a telephone call within two days of discharge. This call is to make certain you understand your discharge instructions and have the opportunity to have any questions answered. You can also easily access your medical information, test results and upcoming appointments via the [email or web address] free online health management tool. You can learn more and sign up at [email or web address]. For assistance setting up your [email or web address] account, please call [phone].    Once again, we want to ensure your discharge home is safe and that you have a clear understanding of any next steps in your care. If you have any questions or concerns, please do not hesitate to contact us, we are here for you. Thank you for choosing [facility] for your healthcare needs.    Sincerely,    Your [facility] Healthcare Team

## 2017-09-25 ENCOUNTER — HOSPITAL ENCOUNTER (OUTPATIENT)
Facility: MEDICAL CENTER | Age: 42
End: 2017-09-25
Attending: NEUROLOGICAL SURGERY | Admitting: NEUROLOGICAL SURGERY
Payer: COMMERCIAL

## 2017-10-24 ENCOUNTER — HOSPITAL ENCOUNTER (OUTPATIENT)
Dept: RADIOLOGY | Facility: MEDICAL CENTER | Age: 42
End: 2017-10-24
Attending: OBSTETRICS & GYNECOLOGY
Payer: COMMERCIAL

## 2017-10-24 DIAGNOSIS — Z12.81: ICD-10-CM

## 2017-10-24 DIAGNOSIS — Z12.31 VISIT FOR SCREENING MAMMOGRAM: ICD-10-CM

## 2017-10-24 PROCEDURE — G0202 SCR MAMMO BI INCL CAD: HCPCS

## 2017-11-22 ENCOUNTER — APPOINTMENT (OUTPATIENT)
Dept: ADMISSIONS | Facility: MEDICAL CENTER | Age: 42
End: 2017-11-22
Payer: COMMERCIAL

## 2017-12-26 ENCOUNTER — HOSPITAL ENCOUNTER (OUTPATIENT)
Dept: RADIOLOGY | Facility: MEDICAL CENTER | Age: 42
End: 2017-12-26
Attending: OBSTETRICS & GYNECOLOGY
Payer: COMMERCIAL

## 2017-12-26 DIAGNOSIS — R10.2 ADNEXAL TENDERNESS, RIGHT: ICD-10-CM

## 2017-12-26 PROCEDURE — 76830 TRANSVAGINAL US NON-OB: CPT

## 2018-01-11 ENCOUNTER — HOSPITAL ENCOUNTER (OUTPATIENT)
Dept: RADIOLOGY | Facility: MEDICAL CENTER | Age: 43
End: 2018-01-11
Attending: FAMILY MEDICINE
Payer: COMMERCIAL

## 2018-01-11 ENCOUNTER — OFFICE VISIT (OUTPATIENT)
Dept: URGENT CARE | Facility: PHYSICIAN GROUP | Age: 43
End: 2018-01-11
Payer: COMMERCIAL

## 2018-01-11 VITALS
RESPIRATION RATE: 16 BRPM | BODY MASS INDEX: 20.16 KG/M2 | SYSTOLIC BLOOD PRESSURE: 122 MMHG | TEMPERATURE: 98.4 F | WEIGHT: 121 LBS | HEART RATE: 60 BPM | OXYGEN SATURATION: 99 % | HEIGHT: 65 IN | DIASTOLIC BLOOD PRESSURE: 64 MMHG

## 2018-01-11 DIAGNOSIS — M54.50 LEFT-SIDED LOW BACK PAIN WITHOUT SCIATICA, UNSPECIFIED CHRONICITY: ICD-10-CM

## 2018-01-11 DIAGNOSIS — N83.202 CYST OF LEFT OVARY: ICD-10-CM

## 2018-01-11 DIAGNOSIS — G90.522 COMPLEX REGIONAL PAIN SYNDROME TYPE 1 OF LEFT LOWER EXTREMITY: ICD-10-CM

## 2018-01-11 LAB
APPEARANCE UR: CLEAR
BILIRUB UR STRIP-MCNC: NORMAL MG/DL
COLOR UR AUTO: YELLOW
GLUCOSE UR STRIP.AUTO-MCNC: NORMAL MG/DL
KETONES UR STRIP.AUTO-MCNC: NORMAL MG/DL
LEUKOCYTE ESTERASE UR QL STRIP.AUTO: NORMAL
NITRITE UR QL STRIP.AUTO: NORMAL
PH UR STRIP.AUTO: 7 [PH] (ref 5–8)
PROT UR QL STRIP: NORMAL MG/DL
RBC UR QL AUTO: NORMAL
SP GR UR STRIP.AUTO: 1
UROBILINOGEN UR STRIP-MCNC: NORMAL MG/DL

## 2018-01-11 PROCEDURE — 72100 X-RAY EXAM L-S SPINE 2/3 VWS: CPT

## 2018-01-11 PROCEDURE — 99204 OFFICE O/P NEW MOD 45 MIN: CPT | Performed by: FAMILY MEDICINE

## 2018-01-11 PROCEDURE — 76830 TRANSVAGINAL US NON-OB: CPT

## 2018-01-11 PROCEDURE — 81002 URINALYSIS NONAUTO W/O SCOPE: CPT | Performed by: FAMILY MEDICINE

## 2018-01-11 RX ORDER — DIAZEPAM 5 MG/1
5 TABLET ORAL EVERY 12 HOURS PRN
Qty: 10 TAB | Refills: 0 | Status: SHIPPED | OUTPATIENT
Start: 2018-01-11 | End: 2018-01-16

## 2018-01-11 NOTE — PROGRESS NOTES
Subjective:      Linn Mcconnell is a 42 y.o. female who presents with Low Back Pain (x 1 month getting worse)            1 month left low back/flank pain near battery pack for spinal stimulator. No redness. No warmth. No dysuria. No hematuria. Pain has been severe and worse with direct contact. BM's normal. +PMH endometriosis as well as L ovarian cyst possibly contributing. No other aggravating or alleviating factors.          Review of Systems   Constitutional: Negative for chills, fever, malaise/fatigue and weight loss.   HENT: Negative for ear discharge, ear pain and sore throat.    Eyes: Negative for discharge and redness.   Respiratory: Negative for shortness of breath and wheezing.    Cardiovascular: Negative for chest pain and palpitations.   Gastrointestinal: Negative for nausea and vomiting.   Genitourinary: Negative for frequency and urgency.   Musculoskeletal: Negative for neck pain.        Chronic L foot pain due to CRPS.    Skin: Negative for itching and rash.   Neurological: Negative for sensory change and focal weakness.        No myelopathy   .  Medications, Allergies, and current problem list reviewed today in Epic  Past Medical History:   Diagnosis Date   • Acid reflux    • Anesthesia     PONV   • Bowel habit changes     constipation   • Complex regional pain syndrome 08-    left foot, 3/10   • Gynecological disorder     pelvic pain   • Osteopenia    • Painful menstruation    • Pelvic and perineal pain    • Personal history of colonic polyps 4/25/2011   • Psychiatric problem     depression   • Vasovagal near syncope 4/25/2011     Social History   Substance Use Topics   • Smoking status: Never Smoker   • Smokeless tobacco: Never Used   • Alcohol use No     Family History   Problem Relation Age of Onset   • Hyperlipidemia Mother    • Cancer Father      lung cancer , non smoker   • Lung Disease Father    • Cancer Maternal Grandmother      thyroid, skin    • Hyperlipidemia Maternal Grandmother  "   • Stroke Maternal Grandfather    • Diabetes Paternal Grandmother    • Cancer Paternal Grandmother      breast            Objective:     /64   Pulse 60   Temp 36.9 °C (98.4 °F)   Resp 16   Ht 1.651 m (5' 5\")   Wt 54.9 kg (121 lb)   SpO2 99%   BMI 20.14 kg/m²      Physical Exam   Constitutional: She appears well-developed and well-nourished. No distress.   Musculoskeletal:        Back:                Assessment/Plan:   UA reviewed  US reviewed    1. Left-sided low back pain without sciatica, unspecified chronicity  US-GYN-PELVIS TRANSVAGINAL    DX-LUMBAR SPINE-2 OR 3 VIEWS    POCT Urinalysis   2. Cyst of left ovary  US-GYN-PELVIS TRANSVAGINAL   3. Complex regional pain syndrome type 1 of left lower extremity  diazepam (VALIUM) 5 MG Tab     Differential diagnosis, natural history, supportive care, and indications for immediate follow-up discussed at length.     She will f/u with gyn and pain management.       "

## 2018-01-18 ASSESSMENT — ENCOUNTER SYMPTOMS
WHEEZING: 0
NAUSEA: 0
SHORTNESS OF BREATH: 0
EYE DISCHARGE: 0
CHILLS: 0
FEVER: 0
WEIGHT LOSS: 0
SORE THROAT: 0
PALPITATIONS: 0
VOMITING: 0
FOCAL WEAKNESS: 0
NECK PAIN: 0
SENSORY CHANGE: 0
EYE REDNESS: 0

## 2018-03-06 ENCOUNTER — HOSPITAL ENCOUNTER (OUTPATIENT)
Dept: RADIOLOGY | Facility: MEDICAL CENTER | Age: 43
End: 2018-03-06
Attending: OBSTETRICS & GYNECOLOGY
Payer: COMMERCIAL

## 2018-03-06 DIAGNOSIS — N83.00 FOLLICULAR CYST OF OVARY: ICD-10-CM

## 2018-03-06 PROCEDURE — 76830 TRANSVAGINAL US NON-OB: CPT

## 2018-05-31 DIAGNOSIS — Z01.812 PRE-OPERATIVE LABORATORY EXAMINATION: ICD-10-CM

## 2018-05-31 LAB
ANION GAP SERPL CALC-SCNC: 9 MMOL/L (ref 0–11.9)
BASOPHILS # BLD AUTO: 1 % (ref 0–1.8)
BASOPHILS # BLD: 0.06 K/UL (ref 0–0.12)
BUN SERPL-MCNC: 8 MG/DL (ref 8–22)
CALCIUM SERPL-MCNC: 9.9 MG/DL (ref 8.5–10.5)
CHLORIDE SERPL-SCNC: 110 MMOL/L (ref 96–112)
CO2 SERPL-SCNC: 21 MMOL/L (ref 20–33)
CREAT SERPL-MCNC: 0.83 MG/DL (ref 0.5–1.4)
EOSINOPHIL # BLD AUTO: 0.22 K/UL (ref 0–0.51)
EOSINOPHIL NFR BLD: 3.8 % (ref 0–6.9)
ERYTHROCYTE [DISTWIDTH] IN BLOOD BY AUTOMATED COUNT: 42.6 FL (ref 35.9–50)
GLUCOSE SERPL-MCNC: 73 MG/DL (ref 65–99)
HCT VFR BLD AUTO: 43.4 % (ref 37–47)
HGB BLD-MCNC: 14.5 G/DL (ref 12–16)
IMM GRANULOCYTES # BLD AUTO: 0.01 K/UL (ref 0–0.11)
IMM GRANULOCYTES NFR BLD AUTO: 0.2 % (ref 0–0.9)
LYMPHOCYTES # BLD AUTO: 1.56 K/UL (ref 1–4.8)
LYMPHOCYTES NFR BLD: 26.7 % (ref 22–41)
MCH RBC QN AUTO: 32 PG (ref 27–33)
MCHC RBC AUTO-ENTMCNC: 33.4 G/DL (ref 33.6–35)
MCV RBC AUTO: 95.8 FL (ref 81.4–97.8)
MONOCYTES # BLD AUTO: 0.25 K/UL (ref 0–0.85)
MONOCYTES NFR BLD AUTO: 4.3 % (ref 0–13.4)
NEUTROPHILS # BLD AUTO: 3.75 K/UL (ref 2–7.15)
NEUTROPHILS NFR BLD: 64 % (ref 44–72)
NRBC # BLD AUTO: 0 K/UL
NRBC BLD-RTO: 0 /100 WBC
PLATELET # BLD AUTO: 223 K/UL (ref 164–446)
PMV BLD AUTO: 10.6 FL (ref 9–12.9)
POTASSIUM SERPL-SCNC: 3.6 MMOL/L (ref 3.6–5.5)
RBC # BLD AUTO: 4.53 M/UL (ref 4.2–5.4)
SODIUM SERPL-SCNC: 140 MMOL/L (ref 135–145)
WBC # BLD AUTO: 5.9 K/UL (ref 4.8–10.8)

## 2018-05-31 PROCEDURE — 80048 BASIC METABOLIC PNL TOTAL CA: CPT

## 2018-05-31 PROCEDURE — 36415 COLL VENOUS BLD VENIPUNCTURE: CPT

## 2018-05-31 PROCEDURE — 85025 COMPLETE CBC W/AUTO DIFF WBC: CPT

## 2018-05-31 RX ORDER — TOPIRAMATE 25 MG/1
25 TABLET ORAL DAILY
COMMUNITY

## 2018-06-06 ENCOUNTER — HOSPITAL ENCOUNTER (OUTPATIENT)
Facility: MEDICAL CENTER | Age: 43
End: 2018-06-06
Attending: OBSTETRICS & GYNECOLOGY | Admitting: OBSTETRICS & GYNECOLOGY
Payer: COMMERCIAL

## 2018-06-06 VITALS
DIASTOLIC BLOOD PRESSURE: 64 MMHG | HEART RATE: 59 BPM | RESPIRATION RATE: 14 BRPM | BODY MASS INDEX: 20.4 KG/M2 | WEIGHT: 119.49 LBS | SYSTOLIC BLOOD PRESSURE: 98 MMHG | TEMPERATURE: 99.6 F | HEIGHT: 64 IN | OXYGEN SATURATION: 98 %

## 2018-06-06 LAB
HCG UR QL: NEGATIVE
SP GR UR REFRACTOMETRY: 1.01

## 2018-06-06 PROCEDURE — 700101 HCHG RX REV CODE 250

## 2018-06-06 PROCEDURE — 160042 HCHG SURGERY MINUTES - EA ADDL 1 MIN LEVEL 5: Performed by: OBSTETRICS & GYNECOLOGY

## 2018-06-06 PROCEDURE — 160002 HCHG RECOVERY MINUTES (STAT): Performed by: OBSTETRICS & GYNECOLOGY

## 2018-06-06 PROCEDURE — 700111 HCHG RX REV CODE 636 W/ 250 OVERRIDE (IP)

## 2018-06-06 PROCEDURE — 700102 HCHG RX REV CODE 250 W/ 637 OVERRIDE(OP): Performed by: OBSTETRICS & GYNECOLOGY

## 2018-06-06 PROCEDURE — 700104 HCHG RX REV CODE 254

## 2018-06-06 PROCEDURE — A9270 NON-COVERED ITEM OR SERVICE: HCPCS | Performed by: OBSTETRICS & GYNECOLOGY

## 2018-06-06 PROCEDURE — 502714 HCHG ROBOTIC SURGERY SERVICES: Performed by: OBSTETRICS & GYNECOLOGY

## 2018-06-06 PROCEDURE — 501330 HCHG SET, CYSTO IRRIG TUBING: Performed by: OBSTETRICS & GYNECOLOGY

## 2018-06-06 PROCEDURE — 160046 HCHG PACU - 1ST 60 MINS PHASE II: Performed by: OBSTETRICS & GYNECOLOGY

## 2018-06-06 PROCEDURE — 88307 TISSUE EXAM BY PATHOLOGIST: CPT

## 2018-06-06 PROCEDURE — 160035 HCHG PACU - 1ST 60 MINS PHASE I: Performed by: OBSTETRICS & GYNECOLOGY

## 2018-06-06 PROCEDURE — 160036 HCHG PACU - EA ADDL 30 MINS PHASE I: Performed by: OBSTETRICS & GYNECOLOGY

## 2018-06-06 PROCEDURE — 502594 HCHG SCISSOR HANDLE, HARMONIC ACE: Performed by: OBSTETRICS & GYNECOLOGY

## 2018-06-06 PROCEDURE — 160048 HCHG OR STATISTICAL LEVEL 1-5: Performed by: OBSTETRICS & GYNECOLOGY

## 2018-06-06 PROCEDURE — 500002 HCHG ADHESIVE, DERMABOND: Performed by: OBSTETRICS & GYNECOLOGY

## 2018-06-06 PROCEDURE — 81025 URINE PREGNANCY TEST: CPT

## 2018-06-06 PROCEDURE — 500868 HCHG NEEDLE, SURGI(VARES): Performed by: OBSTETRICS & GYNECOLOGY

## 2018-06-06 PROCEDURE — 160031 HCHG SURGERY MINUTES - 1ST 30 MINS LEVEL 5: Performed by: OBSTETRICS & GYNECOLOGY

## 2018-06-06 PROCEDURE — 160025 RECOVERY II MINUTES (STATS): Performed by: OBSTETRICS & GYNECOLOGY

## 2018-06-06 PROCEDURE — 160009 HCHG ANES TIME/MIN: Performed by: OBSTETRICS & GYNECOLOGY

## 2018-06-06 PROCEDURE — 700105 HCHG RX REV CODE 258

## 2018-06-06 PROCEDURE — 160047 HCHG PACU  - EA ADDL 30 MINS PHASE II: Performed by: OBSTETRICS & GYNECOLOGY

## 2018-06-06 PROCEDURE — 501838 HCHG SUTURE GENERAL: Performed by: OBSTETRICS & GYNECOLOGY

## 2018-06-06 RX ORDER — BUPIVACAINE HYDROCHLORIDE AND EPINEPHRINE 2.5; 5 MG/ML; UG/ML
INJECTION, SOLUTION EPIDURAL; INFILTRATION; INTRACAUDAL; PERINEURAL
Status: DISCONTINUED | OUTPATIENT
Start: 2018-06-06 | End: 2018-06-06 | Stop reason: HOSPADM

## 2018-06-06 RX ORDER — ONDANSETRON 2 MG/ML
4 INJECTION INTRAMUSCULAR; INTRAVENOUS EVERY 6 HOURS PRN
Status: DISCONTINUED | OUTPATIENT
Start: 2018-06-06 | End: 2018-06-06 | Stop reason: HOSPADM

## 2018-06-06 RX ORDER — SIMETHICONE 80 MG
80 TABLET,CHEWABLE ORAL EVERY 8 HOURS PRN
Status: DISCONTINUED | OUTPATIENT
Start: 2018-06-06 | End: 2018-06-06 | Stop reason: HOSPADM

## 2018-06-06 RX ORDER — BUPROPION HYDROCHLORIDE 150 MG/1
150 TABLET, EXTENDED RELEASE ORAL 2 TIMES DAILY
COMMUNITY
End: 2022-10-04

## 2018-06-06 RX ORDER — IBUPROFEN 200 MG
600 TABLET ORAL EVERY 6 HOURS PRN
Status: DISCONTINUED | OUTPATIENT
Start: 2018-06-06 | End: 2018-06-06 | Stop reason: HOSPADM

## 2018-06-06 RX ORDER — DEXTROSE AND SODIUM CHLORIDE 5; .9 G/100ML; G/100ML
INJECTION, SOLUTION INTRAVENOUS
Status: DISCONTINUED
Start: 2018-06-06 | End: 2018-06-06 | Stop reason: HOSPADM

## 2018-06-06 RX ORDER — POTASSIUM CHLORIDE 750 MG/1
10 TABLET, FILM COATED, EXTENDED RELEASE ORAL DAILY
Status: ON HOLD | COMMUNITY
End: 2018-06-06

## 2018-06-06 RX ORDER — SODIUM CHLORIDE 9 MG/ML
1000 INJECTION, SOLUTION INTRAVENOUS
Status: COMPLETED | OUTPATIENT
Start: 2018-06-06 | End: 2018-06-06

## 2018-06-06 RX ORDER — SODIUM CHLORIDE, SODIUM LACTATE, POTASSIUM CHLORIDE, CALCIUM CHLORIDE 600; 310; 30; 20 MG/100ML; MG/100ML; MG/100ML; MG/100ML
INJECTION, SOLUTION INTRAVENOUS CONTINUOUS
Status: DISCONTINUED | OUTPATIENT
Start: 2018-06-06 | End: 2018-06-06 | Stop reason: HOSPADM

## 2018-06-06 RX ORDER — DEXTROSE MONOHYDRATE 25 G/50ML
INJECTION, SOLUTION INTRAVENOUS
Status: DISCONTINUED
Start: 2018-06-06 | End: 2018-06-06 | Stop reason: HOSPADM

## 2018-06-06 RX ORDER — LIDOCAINE HYDROCHLORIDE 10 MG/ML
0.5 INJECTION, SOLUTION INFILTRATION; PERINEURAL
Status: DISCONTINUED | OUTPATIENT
Start: 2018-06-06 | End: 2018-06-06 | Stop reason: HOSPADM

## 2018-06-06 RX ORDER — METOCLOPRAMIDE HYDROCHLORIDE 5 MG/ML
10 INJECTION INTRAMUSCULAR; INTRAVENOUS EVERY 4 HOURS PRN
Status: DISCONTINUED | OUTPATIENT
Start: 2018-06-06 | End: 2018-06-06 | Stop reason: HOSPADM

## 2018-06-06 RX ORDER — MEPERIDINE HYDROCHLORIDE 50 MG/ML
INJECTION INTRAMUSCULAR; INTRAVENOUS; SUBCUTANEOUS
Status: COMPLETED
Start: 2018-06-06 | End: 2018-06-06

## 2018-06-06 RX ADMIN — SODIUM CHLORIDE 1000 ML: 9 INJECTION, SOLUTION INTRAVENOUS at 07:00

## 2018-06-06 RX ADMIN — MEPERIDINE HYDROCHLORIDE 12.5 MG: 50 INJECTION INTRAMUSCULAR; INTRAVENOUS; SUBCUTANEOUS at 11:10

## 2018-06-06 ASSESSMENT — PAIN SCALES - GENERAL
PAINLEVEL_OUTOF10: 0
PAINLEVEL_OUTOF10: 2
PAINLEVEL_OUTOF10: 4

## 2018-06-06 NOTE — OR SURGEON
Immediate Post OP Note    PreOp Diagnosis: Pelvic pain, h/o endometriosis, suspect adenomyosis, left ovarian cyst    PostOp Diagnosis: Same    Procedure(s):  HYSTERECTOMY ROBOTIC - Wound Class: Clean Contaminated  LEFT SALPINGECTOMY - Wound Class: Clean Contaminated  LEFT OOPHORECTOMY - Wound Class: Clean Contaminated  ADHESIOLYSIS  MODIFIED MCCALLS CULDOPLASTY  CYSTOSCOPY - Wound Class: Clean Contaminated    Surgeon(s):  Patricia Senior M.D.    Anesthesiologist/Type of Anesthesia:  Anesthesiologist: Gómez Franklin M.D./General    Surgical Staff:  Assistant: Abram Bernabe R.N.  Circulator: Jomar Vick R.N.; Chelsi Jose R.N.  Scrub Person: Leslie Bojorquez; Adriana Contreras    Specimens removed if any:  Uterus, left tubes and ovary    Estimated Blood Loss: 10 cc    Findings: Uterus is normal size and boggy consistent with adenomyosis, adhesions in the posterior culdesac, left ovary slightly enlarged but normal appearing, left tube is normal, right tube and ovary is surgically absent    Complications: None        6/6/2018 9:39 AM Patricia Senior M.D.

## 2018-06-06 NOTE — H&P
DATE OF OPERATION:  06/06/2018    CHIEF COMPLAINT:  Pelvic pain, chronic regional pain syndrome, history of   endometriosis and adenomyosis, history of prior surgeries, interested in the   hysterectomy with LSO.    HISTORY OF PRESENT ILLNESS:  The patient is a 43-year-old G2, P3 who came to   our office and in 12/2017 for a second opinion regarding a long complicated   gynecologic history.  The patient states that she has had heavy menstrual   cycles and no desire for fertility in 2014, so had a tubal and endometrial   ablation.  In 2014, she also had hip surgery for a nerve disorder and came out   of it with reflex sympathetic dystrophy.  In 2015, she had a laminectomy and   spinal cord stimulator placed.  In 2016, an appendectomy, complicated by a   seroma.  After that on day 10-17 of her cycle, she had return of the same pain   that she had prior to ____ surgery, which is GI symptoms, pelvic pain, and   rectal pain.  In 08/2010, the patient had a laparoscopy by another   gynecologist and was noted to have endometriosis and suspected for   adenomyosis.  Her right ovary was removed secondary to endometriosis and   Lupron was recommended.  The patient did not want to this secondary to side   effects and now she has all the same symptoms that she had since her   laparoscopy.  She also complains of depression and anxiety.  She was placed on   Wellbutrin by her pain doctor and has had some improvement in her foot pain.    If her pain does get worse cyclically, at this time, she is interested in   proceeding with a hysterectomy and removal of her remaining left ovary.    Risks, benefits, alternatives and procedure were discussed with the patient in   detail and she agrees to proceed.  She does have an ultrasound done in   03/2018, which showed a retroverted uterus measuring a 7x4x5 cm and the   myometrium was normal.  The endometrial echo complex was 0.3 cm and there was   a small amount of fluid in the endometrial  canal.  The left ovary measured   3x3x3 cm and she had a 2.5 cm cyst present.  The right ovary was surgically   absent.    PAST MEDICAL HISTORY:  Complex regional pain syndrome, migraine headaches,   back trouble, ulcer, osteoporosis, depression, anxiety, abnormal Pap smear.    PAST SURGICAL HISTORY:  An appendectomy in ; back surgery with spinal cord   stimulator in ; endometrial ablation in ;  in  and ;   laparoscopy in  and  with removal of her right ovary; hip surgery to   repair torn labrum in .    MENSTRUAL HISTORY:  The patient underwent menarche at age 14.  She has   periods.  She  ____ bleeding, but knows when she has a period secondary to the   terrible pelvic pain and bowel symptoms.    OBSTETRICAL HISTORY:  She has had 2 pregnancies, 3 babies born alive, 2 by    section, largest baby weighed 4 pounds, and the last delivery was in   .    MEDICATIONS:  Include Wellbutrin and supplements.    FAMILY HISTORY:  Father  at age 61 from cancer and otherwise   noncontributory.    SOCIAL HISTORY:  The patient does not smoke, drink, or do drugs.    ALLERGIES:  SCOPOLAMINE, FLAGYL, PAIN MEDS, NORCO AND FENTANYL.    PHYSICAL EXAMINATION:  VITAL SIGNS:  Blood pressure is 118/82, her weight is 119, height is 5 feet 4   inches.  HEENT:  Within normal limits.  NECK:  Supple, without lymphadenopathy or thyromegaly.  CARDIOVASCULAR:  Regular rate and rhythm.  LUNGS:  Clear to auscultation.  BACK:  No CVA tenderness.  ABDOMEN:  Soft and nontender, nondistended.  No masses are palpable.  PELVIC:  EGBUS appears normal.  Vagina appears normal.  Cervix appears normal.    Bimanual exam reveals a normal sized and positioned uterus, which is smooth   in contour and there are no adnexal masses.  EXTREMITIES:  Benign.    ASSESSMENT:  1.  A 42-year-old G2, P3, status post tubal ligation with complex regional   pain syndrome, managed by a pain doctor.  2.  Long complicated  gynecologic history with diagnosis of endometriosis and   adenomyosis, has had prior removal of her right ovary and appendix.  3.  Pelvic pain, bowel symptoms, rectal pain.  4.  Desiring hysterectomy with left salpingo-oophorectomy.    PLAN:  The patient is scheduled for a Da Bar laparoscopic total hysterectomy   with left salpingo-oophorectomy as definitive treatment for her   endometriosis.  Risks, benefits, alternatives and procedure were discussed   with patient in detail and she agrees to proceed.  Preoperative labs will be   obtained.  Preoperative antibiotics will be administered.  If she has any   problems or questions, she can contact my office.  She was given a   prescription for Dilaudid 2 mg, Zofran 8 mg, and Bi-Est was called in to see   her compounding pharmacy.       ____________________________________     Patricia Senior MD    [facility] / NTS    DD:  06/05/2018 22:41:04  DT:  06/05/2018 23:15:43    D#:  [id number]  Job#:  [id number]

## 2018-06-06 NOTE — DISCHARGE INSTRUCTIONS
ACTIVITY: Rest and take it easy for the first 24 hours.  A responsible adult is recommended to remain with you during that time.  It is normal to feel sleepy.  We encourage you to not do anything that requires balance, judgment or coordination.    MILD FLU-LIKE SYMPTOMS ARE NORMAL. YOU MAY EXPERIENCE GENERALIZED MUSCLE ACHES, THROAT IRRITATION, HEADACHE AND/OR SOME NAUSEA.    FOR 24 HOURS DO NOT:  Drive, operate machinery or run household appliances.  Drink beer or alcoholic beverages.   Make important decisions or sign legal documents.    SPECIAL INSTRUCTIONS: POST-OPERATIVE INSTRUCTIONS     The first few days after surgery are the most difficult.  It is important to rest, take your pain medications regularly, stay well hydrated, get up and walk around at least four times a day, and call your doctor if you have any problems.     You may experience shoulder or neck pain for several days after your surgery if it involved laparoscopy.  This is from the gas placed in your abdomen during surgery and usually within a few days this gas is reabsorbed and the pain will subside.  You may use ice or heat, position changes, pain medications to help relieve this pain.     For the first 1-2 weeks, you should be on home rest.  That means no driving or doing organized activities.  Your goal should be to relax, read books, watch movies, nap and put your energies into healing.  After two weeks, you can begin resuming your usual activities other than heavy lifting and sex.  Listen to your body and don't overdo it.     If you are given an incentive spirometer in the hospital, take it home with you and use it every hour while awake for seven days.  This will help to keep your lungs expanded and decrease your chance of getting pneumonia post-op.     You may shower.  After showering, pat incisions dry with a clean towel.  Do not rub.  If steri-strips are covering the incisions, do not remove for at least 1 week.  If glue is on the  incisions, do not peel it off until after 1-2 weeks.  Within the first few days to a week, you may note some leaking from the incision, bruising, or suture material - this is O.K.     You may eat and drink whatever you feel like.  If you don't have much of an appetite, try to at least stay well hydrated.  Several small meals a day may work better at first.  Be sure to eat something before taking your pain medicines, as this will decrease the chance of nausea.     Pain medication and decreased activity may cause constipation.  Please start taking a stool softener when you get home from surgery.  When you stop taking your pain medications and resume more normal activities, you can stop taking the stool softeners. May use over the counter Milk of Magnesia as needed for constipation or Colace over the counter 2 Xs daily as needed for constipation.    You will need to follow-up with your doctor for post-op visits as recommended, usually around two and six weeks post-op.     Pelvic rest for 6 weeks or until cleared by MD    Some vaginal discharge and bleeding can be normal depending on the type of surgery you had.  This should get less with time and is usually gone by 4-6 weeks.     After surgery, the first couple days are the most difficult and then, every day should be better as long as you don't overdo it.  If that is not the case, then please call your doctor's office.     Other reasons to call your doctor's office include these WARNING SIGNS:     Pain that is not relieved by pain medications   Symptoms getting worse over time instead of better   Fever over 101   Nausea and vomiting   Very heavy bleeding with clots   Not passing gas for 48 hours   No bowel movements in 4 days   Redness and swelling around incisions   Difficult or painful urination   Unexplained symptoms    Ice to incision sites  Keep bowels soft and regular  May use colace twice daily as needed for constipation  May use milk of magnesia as needed for  constipation  Shower as desired       DIET: To avoid nausea, slowly advance diet as tolerated, avoiding spicy or greasy foods for the first day.  Add more substantial food to your diet according to your physician's instructions.  Babies can be fed formula or breast milk as soon as they are hungry.  INCREASE FLUIDS AND FIBER TO AVOID CONSTIPATION.    FOLLOW-UP APPOINTMENT:  A follow-up appointment should be arranged with your doctor in *2-6 weeks*; call to schedule.    You should CALL YOUR PHYSICIAN if you develop:  Fever greater than 101 degrees F.  Pain not relieved by medication, or persistent nausea or vomiting.  Excessive bleeding (blood soaking through dressing) or unexpected drainage from the wound.  Extreme redness or swelling around the incision site, drainage of pus or foul smelling drainage.  Inability to urinate or empty your bladder within 8 hours.  Problems with breathing or chest pain.    You should call 911 if you develop problems with breathing or chest pain.  If you are unable to contact your doctor or surgical center, you should go to the nearest emergency room or urgent care center.  Physician's telephone #: *Dr. Senior [phone]*    If any questions arise, call your doctor.  If your doctor is not available, please feel free to call the Surgical Center at [phone].  The Center is open Monday through Friday from 7AM to 7PM.  You can also call the [place] [facility] HOTLINE open 24 hours/day, 7 days/week and speak to a nurse at [phone], or toll free at [phone].    A registered nurse may call you a few days after your surgery to see how you are doing after your procedure.    MEDICATIONS: Resume taking daily medication.  Take prescribed pain medication with food.  If no medication is prescribed, you may take non-aspirin pain medication if needed.  PAIN MEDICATION CAN BE VERY CONSTIPATING.  Take a stool softener or laxative such as senokot, pericolace, or milk of magnesia if needed.    No  pain medications given in recovery.    If your physician has prescribed pain medication that includes Acetaminophen (Tylenol), do not take additional Acetaminophen (Tylenol) while taking the prescribed medication.    Depression / Suicide Risk    As you are discharged from this [facility] facility, it is important to learn how to keep safe from harming yourself.    Recognize the warning signs:  · Abrupt changes in personality, positive or negative- including increase in energy   · Giving away possessions  · Change in eating patterns- significant weight changes-  positive or negative  · Change in sleeping patterns- unable to sleep or sleeping all the time   · Unwillingness or inability to communicate  · Depression  · Unusual sadness, discouragement and loneliness  · Talk of wanting to die  · Neglect of personal appearance   · Rebelliousness- reckless behavior  · Withdrawal from people/activities they love  · Confusion- inability to concentrate     If you or a loved one observes any of these behaviors or has concerns about self-harm, here's what you can do:  · Talk about it- your feelings and reasons for harming yourself  · Remove any means that you might use to hurt yourself (examples: pills, rope, extension cords, firearm)  · Get professional help from the community (Mental Health, Substance Abuse, psychological counseling)  · Do not be alone:Call your Safe Contact- someone whom you trust who will be there for you.  · Call your local CRISIS HOTLINE 213-3929 or 448-254-0018  · Call your local Children's Mobile Crisis Response Team [place] (847) 871-5054 or www.SomethingIndie  · Call the toll free National Suicide Prevention Hotlines   · National Suicide Prevention Lifeline 317-560-TRAL (6577)  · National Hope Line Network 800-SUICIDE (077-1186)

## 2018-06-06 NOTE — OR NURSING
Prior to discharge bladder backfilled per orders and interiano removed.  Patient voided 350 mL in toilet.  Post void bladder scan revealed 30 mL in bladder.      Discharge instructions reviewed, patient and  verbalized understanding.

## 2018-06-06 NOTE — OP REPORT
DATE OF SERVICE:  06/06/2018    PREOPERATIVE DIAGNOSES:  Pelvic pain, history of endometriosis, suspect   adenomyosis and left ovarian cyst.    POSTOPERATIVE DIAGNOSES:  Pelvic pain, history of endometriosis, suspect   adenomyosis and left ovarian cyst.    PROCEDURES PERFORMED:  Da Bar laparoscopic total hysterectomy with left   salpingo-oophorectomy, adhesiolysis, modified Vogel's culdoplasty, and   cystoscopy.    SURGEON:  Patricia Senior MD    ASSISTANT:  LINDSAY White    ANESTHESIOLOGIST:  Gómez Franklin MD    ANESTHESIA:  General endotracheal.    ESTIMATED BLOOD LOSS:  10 mL.    SPECIMEN:  Uterus, left tube and ovary.    FINDINGS:  Include the uterus to be fairly normal size and boggy consistent   with adenomyosis.  There were adhesions in the posterior cul-de-sac.  The left   ovary was slightly enlarged, but normal appearing on the outside.  The left   tube was normal.  The right tube and ovary are surgically absent.  We were   unable to get in with a uterine manipulator secondary to prior endometrial   ablation and scarring.    COMPLICATIONS:  None.    TECHNIQUE:  The patient was taken to the operating room where general   anesthesia was placed.  She was then placed in the [facility] with   excellent positioning, prepped and draped in the normal sterile fashion.  A   VCare uterine manipulator was attempted to be placed, but we were unable to   place this, so I used the white colpo probe.  Once this was taken care of,   attention was then turned to the abdomen where 0.25% Marcaine with epinephrine   was then injected at the base of umbilicus.  A 1 cm incision was made with   the scalpel.  Veress needle placed.  Pneumoperitoneum created with CO2 gas.    The trocar was introduced without difficulty.  The above findings were noted.    An 8 mm incision was made 10 cm right, 10 and 20 cm to the left and these   trocars were placed under direct visualization without difficulty.  Once in   the  appropriate location, the da Bar was brought to the patient's right side   and side docking took place without difficulty.  The Harmonic scalpel was   placed in the right arm, [place] bipolar in the left arm and a straight scope   was used for this procedure.  Once instruments were in their appropriate   location, I went to the da Bar console.  The patient had elected to have me   remove her left tube and ovary.  First adhesions were taken down and attention   was turned to the right side.  The round ligament was taken down.  The   anterior leaf of the broad ligament incised, posterior leaf incised, uterine   vessels were then clamped, desiccated and cut at about the level of the   cardinal ligament complex.  Attention was then turned to the left side.  The   fallopian tube was grasped and brought towards the midline.  The   infundibulopelvic ligament was isolated, clamped, desiccated, and cut.  The   round ligament clamped, desiccated and cut, intervening tissue clamped,   desiccated, and cut.  Anterior leaf of the broad ligament incised and   posterior leaf incised, uterine vessels were then clamped, desiccated and cut   at about the level of the cardinal ligament complex.  The endopelvic fascia   was scored anteriorly and posteriorly, then circumferentially and the Harmonic   scalpel was used to make the colpotomy incision.  Once this was complete, the   Harmonic scalpel was switched out for a jarred suture cut, 0 Vicryl was placed   at each of the cuff angles incorporating the uterosacral ligament on each   side.  One of these sutures was used to reapproximate the vaginal mucosa, the   other the endopelvic fascia.  Excellent reapproximation was achieved.  PDS was   used to plicate the uterosacral ligaments in the midline, incorporating the   pubocervical fascia posteriorly for excellent support.  Monocryl was   introduced.  This was used to reapproximate the peritoneal surfaces from round   ligament to round  ligament incorporating the round ligaments on each side.    Once this was complete, the bladder was distended to ensure no injury to the   bladder and the bladder distended nicely without problem.  The Slade catheter   was then drained and pictures were taken.  The instruments were removed under   direct visualization.  I then scrubbed back into the case.  The   pneumoperitoneum was released.  The trocars were removed and the umbilical   fascia was reapproximated with 0 Vicryl and skin with Dermabond.  Excellent   reapproximation was achieved.  After IV administration of indigo carmine,   cystoscopic examination of bladder revealed a normal bladder with good efflux   of indigo carmine through the ureteral os bilaterally.  The cystoscope was   then removed.  Slade catheter replaced and attention was then turned to the   vagina.  A Graves speculum was then used to view the vagina, this appeared   normal.  There was no bleeding or lacerations.  Graves speculum was removed.    Bimanual exam performed and this was normal.  The patient tolerated the   procedure very well and was brought to recovery room in stable condition.       ____________________________________     MD EZRA Valerio / NTS    DD:  06/06/2018 09:49:31  DT:  06/06/2018 10:33:38    D#:  [id number]  Job#:  [id number]

## 2018-07-30 ENCOUNTER — APPOINTMENT (OUTPATIENT)
Dept: PAIN MANAGEMENT | Facility: REHABILITATION | Age: 43
End: 2018-07-30
Attending: ANESTHESIOLOGY
Payer: COMMERCIAL

## 2018-08-28 ENCOUNTER — APPOINTMENT (OUTPATIENT)
Dept: ADMISSIONS | Facility: MEDICAL CENTER | Age: 43
End: 2018-08-28
Attending: COLON & RECTAL SURGERY
Payer: COMMERCIAL

## 2018-08-29 ENCOUNTER — HOSPITAL ENCOUNTER (OUTPATIENT)
Facility: MEDICAL CENTER | Age: 43
End: 2018-08-29
Attending: COLON & RECTAL SURGERY | Admitting: COLON & RECTAL SURGERY
Payer: COMMERCIAL

## 2018-08-29 VITALS
RESPIRATION RATE: 15 BRPM | BODY MASS INDEX: 20.02 KG/M2 | WEIGHT: 117.28 LBS | TEMPERATURE: 97.5 F | HEART RATE: 58 BPM | OXYGEN SATURATION: 100 % | SYSTOLIC BLOOD PRESSURE: 82 MMHG | HEIGHT: 64 IN | DIASTOLIC BLOOD PRESSURE: 48 MMHG

## 2018-08-29 PROCEDURE — 160046 HCHG PACU - 1ST 60 MINS PHASE II: Performed by: COLON & RECTAL SURGERY

## 2018-08-29 PROCEDURE — 160009 HCHG ANES TIME/MIN: Performed by: COLON & RECTAL SURGERY

## 2018-08-29 PROCEDURE — 160036 HCHG PACU - EA ADDL 30 MINS PHASE I: Performed by: COLON & RECTAL SURGERY

## 2018-08-29 PROCEDURE — 160025 RECOVERY II MINUTES (STATS): Performed by: COLON & RECTAL SURGERY

## 2018-08-29 PROCEDURE — 700101 HCHG RX REV CODE 250

## 2018-08-29 PROCEDURE — 501838 HCHG SUTURE GENERAL: Performed by: COLON & RECTAL SURGERY

## 2018-08-29 PROCEDURE — 160027 HCHG SURGERY MINUTES - 1ST 30 MINS LEVEL 2: Performed by: COLON & RECTAL SURGERY

## 2018-08-29 PROCEDURE — 88305 TISSUE EXAM BY PATHOLOGIST: CPT

## 2018-08-29 PROCEDURE — A6403 STERILE GAUZE>16 <= 48 SQ IN: HCPCS | Performed by: COLON & RECTAL SURGERY

## 2018-08-29 PROCEDURE — 700111 HCHG RX REV CODE 636 W/ 250 OVERRIDE (IP)

## 2018-08-29 PROCEDURE — 160035 HCHG PACU - 1ST 60 MINS PHASE I: Performed by: COLON & RECTAL SURGERY

## 2018-08-29 PROCEDURE — 160048 HCHG OR STATISTICAL LEVEL 1-5: Performed by: COLON & RECTAL SURGERY

## 2018-08-29 PROCEDURE — 160002 HCHG RECOVERY MINUTES (STAT): Performed by: COLON & RECTAL SURGERY

## 2018-08-29 RX ORDER — SODIUM CHLORIDE 9 MG/ML
INJECTION, SOLUTION INTRAVENOUS CONTINUOUS
Status: DISCONTINUED | OUTPATIENT
Start: 2018-08-29 | End: 2018-08-29 | Stop reason: HOSPADM

## 2018-08-29 RX ADMIN — SODIUM CHLORIDE: 9 INJECTION, SOLUTION INTRAVENOUS at 07:30

## 2018-08-29 ASSESSMENT — PAIN SCALES - GENERAL
PAINLEVEL_OUTOF10: 0

## 2018-08-29 NOTE — OR NURSING
POC reviewed with patient. Call light in reach and resting comfortably in bed. Hourly rounding in place. Instructed to call if she has any needs or questions.

## 2018-08-29 NOTE — OP REPORT
DATE OF SERVICE:  08/29/2018    PREOPERATIVE DIAGNOSIS:  Rectal pain.    POSTOPERATIVE DIAGNOSIS:  Normal-appearing anus and rectum on examination   under anesthesia and flexible sigmoidoscopy.    OPERATION PERFORMED:  1.  Flexible sigmoidoscopy with random rectal biopsy with rectal mucosal   biopsy.  2.  Examination under anesthesia with proctoscopy and Anthony-Avtar anoscopy.    SURGEON:  Maldonado Schumacher MD    ANESTHESIOLOGIST:  Felix Larios MD    INDICATIONS FOR PROCEDURE:  The patient is a 43-year-old female with a history   of anorectal pain that has been persistent and tends to be severe and   accompany passage of flatus.  She comes today for anorectal evaluation,   flexible sigmoidoscopy and biopsies.    DETAILS OF PROCEDURE:  After an extensive informed consent discussion process,   the patient was brought to the operating room.  She was placed in a supine   position on the operating table.  After induction of anesthesia, she was   repositioned into lithotomy with Yellofin stirrups, sequential compression   stockings and appropriate padding.  The anorectal region was prepped and   draped in usual fashion.  After administration of intravenous antibiotics,   careful examination under anesthesia was performed.  Careful examination of   the anus and perianal regions demonstrated no sinuses or external openings.    There was a left labial anterior scar well healed.  There was no sign of any   abscess, cellulitis, fistulization, or other pathology.  Digital rectal exam   demonstrated normal tone and no lesions.  The flexible sigmoidoscope was then   introduced, it was advanced into the rectum without difficulty and slowly   advanced to the sigmoid colon.  The sigmoidoscope was slowly withdrawn.  A   careful circumferential inspection was made of the colorectal mucosa.  The   mucosa appeared entirely normal.  There were no lesions, no inflammation or   other abnormalities.  Random rectal mucosal biopsy was  obtained and sent for   pathology.  The sigmoidoscope was then withdrawn.    Proctoscopy and Anthony-Avtar anoscopy were then performed.  Careful   examination and palpation demonstrated no lesions or identifiable sources for   the pain.    Needle, sponge and instrument counts were correct.    ESTIMATED BLOOD LOSS:  Minimal.    COMPLICATIONS:  None.       ____________________________________     AICHA KNOX MD    [facility] / NTS    DD:  08/29/2018 08:53:13  DT:  08/29/2018 09:39:30    D#:  [id number]  Job#:  [id number]    cc: ELSIE MELGAR MD, CHEYANNE NOBLES MD, Patricia Senior MD, Jolie Mclean MD

## 2018-08-29 NOTE — DISCHARGE INSTRUCTIONS
ACTIVITY: Rest and take it easy for the first 24 hours.  A responsible adult is recommended to remain with you during that time.  It is normal to feel sleepy.  We encourage you to not do anything that requires balance, judgment or coordination.    MILD FLU-LIKE SYMPTOMS ARE NORMAL. YOU MAY EXPERIENCE GENERALIZED MUSCLE ACHES, THROAT IRRITATION, HEADACHE AND/OR SOME NAUSEA.    FOR 24 HOURS DO NOT:  Drive, operate machinery or run household appliances.  Drink beer or alcoholic beverages.   Make important decisions or sign legal documents.    SPECIAL INSTRUCTIONS: **  D/C instructions:     1. DIET: Upon discharge from the hospital start with light fluids then resume your normal preoperative diet. Depending on how you are feeling and whether you have nausea or not, you may wish to stay with a bland diet for the first few days. However, you can advance this as quickly as you feel ready.  Avoid foods that you know will cause constipation.  Stick with soft, bland foods that have been easy to digest in the past.     2. ACTIVITIES: Limit your activity for the first 5-7 days following surgery.  You may drive whenever you are off pain medications and are able to perform the activities needed to drive, i.e. turning, bending, twisting, etc.     3. BATHING/WOUND CARE: A biopsy was taken during the flexible sigmoidoscopy. You may pass some blood and mucus for the first week or more, with or without bowel movements.  You may get the wound wet at any time after leaving the hospital.     4. BOWEL FUNCTION: It is very important to keep your bowel movements soft.  Pain medication and lack of activity will causes constipation.  Take a stool softener either prescribed or over the counter and make sure you are drinking about 64 oz (1/2 gallon) of water everyday.  Taking a fiber supplement like Benefiber or metamucil on a daily basis also helps.  Continue this practice even after you have fully recovered to keep your stools soft and to  avoid straining.  If you have not moved your bowels by day 3 after surgery; take Marianne lax, Milk of magnesia, or another laxative until you have a bowel movement.  DO NOT use suppositories or enemas     6. PAIN MEDICATION: You should not have pain after your procedure.  You will not be given a prescription for pain medication.  If you have pain and it becomes severe and you are unable to control it with Tylenol, you call our office or you may need to go to the emergency room for IV pain control.  If you need a pain medication refill please call the office during business hours.     7.CALL IF YOU HAVE: (1) Fevers to more than 101.50 F, (2) Unusual chest or leg pain, (3) Drainage or fluid from incision that may be foul smelling, increased tenderness or soreness at the wound or the wound edges are no longer together, redness or swelling at the incision site. Please do not hesitate to call with any other questions.     8. APPOINTMENT: Contact our office at [phone] for results of the biopsy in 1-2 weeks.     If you have any additional questions, please do not hesitate to call the office and speak to either myself or the physician on call.     Office address:   [address] [place] [address], [address], [place] [address]     [facility]   [phone]   Question: Against Medical Advice Answer: No     *    DIET: To avoid nausea, slowly advance diet as tolerated, avoiding spicy or greasy foods for the first day.  Add more substantial food to your diet according to your physician's instructions.  Babies can be fed formula or breast milk as soon as they are hungry.  INCREASE FLUIDS AND FIBER TO AVOID CONSTIPATION.      You should CALL YOUR PHYSICIAN if you develop:  Fever greater than 101 degrees F.  Pain not relieved by medication, or persistent nausea or vomiting.  Excessive bleeding (blood soaking through dressing) or unexpected drainage from the wound.  Extreme redness or swelling around the incision  site, drainage of pus or foul smelling drainage.  Inability to urinate or empty your bladder within 8 hours.  Problems with breathing or chest pain.    You should call 911 if you develop problems with breathing or chest pain.  If you are unable to contact your doctor or surgical center, you should go to the nearest emergency room or urgent care center.  Physician's telephone #: **Dr. Schumacher  [phone]*    If any questions arise, call your doctor.  If your doctor is not available, please feel free to call the Surgical Center at {Surgical Dept Numbers:[id number]}.  The Center is open Monday through Friday from 7AM to 7PM.  You can also call the HEALTH HOTLINE open 24 hours/day, 7 days/week and speak to a nurse at [phone], or toll free at [phone].    A registered nurse may call you a few days after your surgery to see how you are doing after your procedure.    MEDICATIONS: Resume taking daily medication.  Take prescribed pain medication with food.  If no medication is prescribed, you may take non-aspirin pain medication if needed.  PAIN MEDICATION CAN BE VERY CONSTIPATING.  Take a stool softener or laxative such as senokot, pericolace, or milk of magnesia if needed.      If your physician has prescribed pain medication that includes Acetaminophen (Tylenol), do not take additional Acetaminophen (Tylenol) while taking the prescribed medication.    Depression / Suicide Risk    As you are discharged from this [facility] facility, it is important to learn how to keep safe from harming yourself.    Recognize the warning signs:  · Abrupt changes in personality, positive or negative- including increase in energy   · Giving away possessions  · Change in eating patterns- significant weight changes-  positive or negative  · Change in sleeping patterns- unable to sleep or sleeping all the time   · Unwillingness or inability to communicate  · Depression  · Unusual sadness, discouragement and loneliness  · Talk of wanting  to die  · Neglect of personal appearance   · Rebelliousness- reckless behavior  · Withdrawal from people/activities they love  · Confusion- inability to concentrate     If you or a loved one observes any of these behaviors or has concerns about self-harm, here's what you can do:  · Talk about it- your feelings and reasons for harming yourself  · Remove any means that you might use to hurt yourself (examples: pills, rope, extension cords, firearm)  · Get professional help from the community (Mental Health, Substance Abuse, psychological counseling)  · Do not be alone:Call your Safe Contact- someone whom you trust who will be there for you.  · Call your local CRISIS HOTLINE 117-4292 or 801-088-0419  · Call your local Children's Mobile Crisis Response Team [place] (057) 067-7942 or www.Sqwiggle  · Call the toll free National Suicide Prevention Hotlines   · National Suicide Prevention Lifeline 349-045-MBVX (5722)  · National Hope Line Network 800-SUICIDE (690-6555)

## 2018-09-24 ENCOUNTER — HOSPITAL ENCOUNTER (OUTPATIENT)
Dept: RADIOLOGY | Facility: REHABILITATION | Age: 43
End: 2018-09-24
Attending: ANESTHESIOLOGY

## 2018-09-24 ENCOUNTER — HOSPITAL ENCOUNTER (OUTPATIENT)
Dept: PAIN MANAGEMENT | Facility: REHABILITATION | Age: 43
End: 2018-09-24
Attending: ANESTHESIOLOGY
Payer: COMMERCIAL

## 2018-09-24 VITALS
BODY MASS INDEX: 20.21 KG/M2 | WEIGHT: 118.39 LBS | DIASTOLIC BLOOD PRESSURE: 79 MMHG | HEIGHT: 64 IN | TEMPERATURE: 97.2 F | OXYGEN SATURATION: 100 % | SYSTOLIC BLOOD PRESSURE: 115 MMHG | HEART RATE: 65 BPM | RESPIRATION RATE: 16 BRPM

## 2018-09-24 PROCEDURE — 700111 HCHG RX REV CODE 636 W/ 250 OVERRIDE (IP)

## 2018-09-24 PROCEDURE — 20550 NJX 1 TENDON SHEATH/LIGAMENT: CPT

## 2018-09-24 PROCEDURE — 76942 ECHO GUIDE FOR BIOPSY: CPT

## 2018-09-24 PROCEDURE — 700101 HCHG RX REV CODE 250

## 2018-09-24 RX ORDER — BUPIVACAINE HYDROCHLORIDE 5 MG/ML
INJECTION, SOLUTION EPIDURAL; INTRACAUDAL
Status: COMPLETED
Start: 2018-09-24 | End: 2018-09-24

## 2018-09-24 RX ORDER — METHYLPREDNISOLONE ACETATE 80 MG/ML
INJECTION, SUSPENSION INTRA-ARTICULAR; INTRALESIONAL; INTRAMUSCULAR; SOFT TISSUE
Status: COMPLETED
Start: 2018-09-24 | End: 2018-09-24

## 2018-09-24 RX ADMIN — BUPIVACAINE HYDROCHLORIDE 2 ML: 5 INJECTION, SOLUTION EPIDURAL; INTRACAUDAL; PERINEURAL at 11:16

## 2018-09-24 RX ADMIN — METHYLPREDNISOLONE ACETATE 80 MG: 80 INJECTION, SUSPENSION INTRA-ARTICULAR; INTRALESIONAL; INTRAMUSCULAR; SOFT TISSUE at 11:16

## 2018-09-24 ASSESSMENT — PAIN SCALES - GENERAL
PAINLEVEL_OUTOF10: 2
PAINLEVEL_OUTOF10: 4

## 2018-09-24 NOTE — NON-PROVIDER
Patient positioned pre-procedure by RN,CST and xray tech. Pillow placed under lower legs  for support.

## 2018-09-24 NOTE — NON-PROVIDER
Reviewed Plan of Care with patient. Confirmed that she does not take any blood thinning medications.  Confirmed that she has a . Reviewed home care instructions with patient prior to procedure. All questions and concerns addressed.   Dr Garcia in to evaluate patient.

## 2018-10-25 ENCOUNTER — APPOINTMENT (OUTPATIENT)
Dept: RADIOLOGY | Facility: MEDICAL CENTER | Age: 43
End: 2018-10-25
Attending: FAMILY MEDICINE
Payer: COMMERCIAL

## 2018-11-19 ENCOUNTER — APPOINTMENT (OUTPATIENT)
Dept: RADIOLOGY | Facility: MEDICAL CENTER | Age: 43
End: 2018-11-19
Attending: FAMILY MEDICINE
Payer: COMMERCIAL

## 2019-01-17 ENCOUNTER — HOSPITAL ENCOUNTER (OUTPATIENT)
Dept: RADIOLOGY | Facility: MEDICAL CENTER | Age: 44
End: 2019-01-17
Attending: FAMILY MEDICINE
Payer: COMMERCIAL

## 2019-01-17 DIAGNOSIS — Z12.31 BREAST CANCER SCREENING BY MAMMOGRAM: ICD-10-CM

## 2019-01-17 PROCEDURE — 77063 BREAST TOMOSYNTHESIS BI: CPT

## 2019-04-03 ENCOUNTER — HOSPITAL ENCOUNTER (OUTPATIENT)
Dept: LAB | Facility: MEDICAL CENTER | Age: 44
End: 2019-04-03
Attending: PSYCHIATRY & NEUROLOGY
Payer: COMMERCIAL

## 2019-04-03 LAB
25(OH)D3 SERPL-MCNC: 29 NG/ML (ref 30–100)
ALBUMIN SERPL BCP-MCNC: 4.2 G/DL (ref 3.2–4.9)
ALBUMIN/GLOB SERPL: 1.8 G/DL
ALP SERPL-CCNC: 40 U/L (ref 30–99)
ALT SERPL-CCNC: 14 U/L (ref 2–50)
ANION GAP SERPL CALC-SCNC: 7 MMOL/L (ref 0–11.9)
AST SERPL-CCNC: 19 U/L (ref 12–45)
BASOPHILS # BLD AUTO: 2 % (ref 0–1.8)
BASOPHILS # BLD: 0.06 K/UL (ref 0–0.12)
BILIRUB SERPL-MCNC: 0.5 MG/DL (ref 0.1–1.5)
BUN SERPL-MCNC: 14 MG/DL (ref 8–22)
CALCIUM SERPL-MCNC: 9.1 MG/DL (ref 8.5–10.5)
CHLORIDE SERPL-SCNC: 111 MMOL/L (ref 96–112)
CO2 SERPL-SCNC: 25 MMOL/L (ref 20–33)
CREAT SERPL-MCNC: 0.78 MG/DL (ref 0.5–1.4)
EOSINOPHIL # BLD AUTO: 0.25 K/UL (ref 0–0.51)
EOSINOPHIL NFR BLD: 8.4 % (ref 0–6.9)
ERYTHROCYTE [DISTWIDTH] IN BLOOD BY AUTOMATED COUNT: 42.9 FL (ref 35.9–50)
GLOBULIN SER CALC-MCNC: 2.3 G/DL (ref 1.9–3.5)
GLUCOSE SERPL-MCNC: 71 MG/DL (ref 65–99)
HCT VFR BLD AUTO: 43.8 % (ref 37–47)
HGB BLD-MCNC: 14.4 G/DL (ref 12–16)
IMM GRANULOCYTES # BLD AUTO: 0 K/UL (ref 0–0.11)
IMM GRANULOCYTES NFR BLD AUTO: 0 % (ref 0–0.9)
LYMPHOCYTES # BLD AUTO: 1 K/UL (ref 1–4.8)
LYMPHOCYTES NFR BLD: 33.6 % (ref 22–41)
MCH RBC QN AUTO: 31.8 PG (ref 27–33)
MCHC RBC AUTO-ENTMCNC: 32.9 G/DL (ref 33.6–35)
MCV RBC AUTO: 96.7 FL (ref 81.4–97.8)
MONOCYTES # BLD AUTO: 0.16 K/UL (ref 0–0.85)
MONOCYTES NFR BLD AUTO: 5.4 % (ref 0–13.4)
NEUTROPHILS # BLD AUTO: 1.51 K/UL (ref 2–7.15)
NEUTROPHILS NFR BLD: 50.6 % (ref 44–72)
NRBC # BLD AUTO: 0 K/UL
NRBC BLD-RTO: 0 /100 WBC
PLATELET # BLD AUTO: 221 K/UL (ref 164–446)
PMV BLD AUTO: 11.6 FL (ref 9–12.9)
POTASSIUM SERPL-SCNC: 4 MMOL/L (ref 3.6–5.5)
PROT SERPL-MCNC: 6.5 G/DL (ref 6–8.2)
RBC # BLD AUTO: 4.53 M/UL (ref 4.2–5.4)
SODIUM SERPL-SCNC: 143 MMOL/L (ref 135–145)
T4 FREE SERPL-MCNC: 0.69 NG/DL (ref 0.53–1.43)
TSH SERPL DL<=0.005 MIU/L-ACNC: 0.42 UIU/ML (ref 0.38–5.33)
VIT B12 SERPL-MCNC: 539 PG/ML (ref 211–911)
WBC # BLD AUTO: 3 K/UL (ref 4.8–10.8)

## 2019-04-03 PROCEDURE — 80307 DRUG TEST PRSMV CHEM ANLYZR: CPT

## 2019-04-03 PROCEDURE — 82607 VITAMIN B-12: CPT

## 2019-04-03 PROCEDURE — 84443 ASSAY THYROID STIM HORMONE: CPT

## 2019-04-03 PROCEDURE — 85025 COMPLETE CBC W/AUTO DIFF WBC: CPT

## 2019-04-03 PROCEDURE — 80053 COMPREHEN METABOLIC PANEL: CPT

## 2019-04-03 PROCEDURE — 82306 VITAMIN D 25 HYDROXY: CPT

## 2019-04-03 PROCEDURE — 84439 ASSAY OF FREE THYROXINE: CPT

## 2019-04-03 PROCEDURE — 81291 MTHFR GENE: CPT

## 2019-04-03 PROCEDURE — 36415 COLL VENOUS BLD VENIPUNCTURE: CPT

## 2019-04-05 LAB
AMPHET CTO UR CFM-MCNC: NEGATIVE NG/ML
BARBITURATES CTO UR CFM-MCNC: NEGATIVE NG/ML
BENZODIAZ CTO UR CFM-MCNC: NEGATIVE NG/ML
CANNABINOIDS CTO UR CFM-MCNC: NEGATIVE NG/ML
COCAINE CTO UR CFM-MCNC: NEGATIVE NG/ML
DRUG COMMENT 753798: NORMAL
METHADONE CTO UR CFM-MCNC: NEGATIVE NG/ML
OPIATES CTO UR CFM-MCNC: NEGATIVE NG/ML
PCP CTO UR CFM-MCNC: NEGATIVE NG/ML
PROPOXYPH CTO UR CFM-MCNC: NEGATIVE NG/ML

## 2019-04-08 LAB
MTHFR C.1298A>C GENO BLD/T: ABNORMAL
MTHFR C.677C>T GENO BLD/T: NEGATIVE
MTHFR GENE MUT ANL BLD/T: ABNORMAL

## 2019-06-06 ENCOUNTER — HOSPITAL ENCOUNTER (OUTPATIENT)
Facility: MEDICAL CENTER | Age: 44
End: 2019-06-06
Attending: INTERNAL MEDICINE
Payer: COMMERCIAL

## 2019-06-06 PROCEDURE — 83520 IMMUNOASSAY QUANT NOS NONAB: CPT

## 2019-06-09 LAB — ELASTASE PANC STL-MCNT: >500 UG/G

## 2019-07-15 ENCOUNTER — HOSPITAL ENCOUNTER (OUTPATIENT)
Dept: LAB | Facility: MEDICAL CENTER | Age: 44
End: 2019-07-15
Attending: INTERNAL MEDICINE
Payer: COMMERCIAL

## 2019-07-15 LAB — LDH SERPL L TO P-CCNC: 183 U/L (ref 107–266)

## 2019-07-15 PROCEDURE — 82785 ASSAY OF IGE: CPT

## 2019-07-15 PROCEDURE — 80074 ACUTE HEPATITIS PANEL: CPT

## 2019-07-15 PROCEDURE — 87389 HIV-1 AG W/HIV-1&-2 AB AG IA: CPT

## 2019-07-15 PROCEDURE — 83615 LACTATE (LD) (LDH) ENZYME: CPT

## 2019-07-15 PROCEDURE — 82784 ASSAY IGA/IGD/IGG/IGM EACH: CPT

## 2019-07-15 PROCEDURE — 36415 COLL VENOUS BLD VENIPUNCTURE: CPT

## 2019-07-15 PROCEDURE — 86317 IMMUNOASSAY INFECTIOUS AGENT: CPT | Mod: 91

## 2019-07-16 LAB
HAV IGM SERPL QL IA: NEGATIVE
HBV CORE IGM SER QL: NEGATIVE
HBV SURFACE AG SER QL: NEGATIVE
HCV AB SER QL: NEGATIVE
HIV 1+2 AB+HIV1 P24 AG SERPL QL IA: NON REACTIVE

## 2019-07-17 LAB
IGA SERPL-MCNC: 188 MG/DL (ref 68–408)
IGE SERPL-ACNC: 7 KU/L
IGG SERPL-MCNC: 1010 MG/DL (ref 768–1632)
IGM SERPL-MCNC: 139 MG/DL (ref 35–263)

## 2019-07-18 LAB
C DIPHTHERIAE IGG SER-ACNC: 0.3 IU/ML
S PN DA SERO 19F IGG SER-MCNC: 3.09 UG/ML
S PNEUM DA 1 IGG SER-MCNC: 0.24 UG/ML
S PNEUM DA 12F IGG SER-MCNC: 0.02 UG/ML
S PNEUM DA 14 IGG SER-MCNC: 0.33 UG/ML
S PNEUM DA 18C IGG SER-MCNC: 0.57 UG/ML
S PNEUM DA 23F IGG SER-MCNC: 0.11 UG/ML
S PNEUM DA 3 IGG SER-MCNC: 0.06 UG/ML
S PNEUM DA 4 IGG SER-MCNC: 0.24 UG/ML
S PNEUM DA 5 IGG SER-MCNC: 1.24 UG/ML
S PNEUM DA 6B IGG SER-MCNC: 0.09 UG/ML
S PNEUM DA 7F IGG SER-MCNC: 10.2 UG/ML
S PNEUM DA 8 IGG SER-MCNC: 0.12 UG/ML
S PNEUM DA 9N IGG SER-MCNC: 1.56 UG/ML
S PNEUM DA 9V IGG SER-MCNC: 1.4 UG/ML
S PNEUM SEROTYPE IGG SER-IMP: NORMAL

## 2019-10-02 ENCOUNTER — HOSPITAL ENCOUNTER (OUTPATIENT)
Dept: RADIOLOGY | Facility: MEDICAL CENTER | Age: 44
End: 2019-10-02
Attending: SURGERY
Payer: COMMERCIAL

## 2019-10-02 DIAGNOSIS — R59.0 LAD (LYMPHADENOPATHY), INGUINAL: ICD-10-CM

## 2019-10-02 PROCEDURE — 74177 CT ABD & PELVIS W/CONTRAST: CPT

## 2019-10-02 PROCEDURE — 700117 HCHG RX CONTRAST REV CODE 255: Performed by: SURGERY

## 2019-10-02 RX ADMIN — IOHEXOL 100 ML: 350 INJECTION, SOLUTION INTRAVENOUS at 11:23

## 2019-10-02 RX ADMIN — IOHEXOL 25 ML: 240 INJECTION, SOLUTION INTRATHECAL; INTRAVASCULAR; INTRAVENOUS; ORAL at 11:22

## 2019-10-07 ENCOUNTER — OFFICE VISIT (OUTPATIENT)
Dept: CARDIOLOGY | Facility: MEDICAL CENTER | Age: 44
End: 2019-10-07
Payer: COMMERCIAL

## 2019-10-07 ENCOUNTER — HOSPITAL ENCOUNTER (OUTPATIENT)
Dept: RADIOLOGY | Facility: MEDICAL CENTER | Age: 44
End: 2019-10-07
Attending: INTERNAL MEDICINE
Payer: COMMERCIAL

## 2019-10-07 VITALS
DIASTOLIC BLOOD PRESSURE: 62 MMHG | SYSTOLIC BLOOD PRESSURE: 118 MMHG | HEART RATE: 60 BPM | BODY MASS INDEX: 18.61 KG/M2 | WEIGHT: 109 LBS | HEIGHT: 64 IN

## 2019-10-07 DIAGNOSIS — I45.9 SKIPPED BEATS: ICD-10-CM

## 2019-10-07 DIAGNOSIS — R07.9 CHEST PAIN, UNSPECIFIED TYPE: ICD-10-CM

## 2019-10-07 LAB — EKG IMPRESSION: NORMAL

## 2019-10-07 PROCEDURE — 71046 X-RAY EXAM CHEST 2 VIEWS: CPT

## 2019-10-07 PROCEDURE — 93000 ELECTROCARDIOGRAM COMPLETE: CPT | Performed by: INTERNAL MEDICINE

## 2019-10-07 PROCEDURE — 99204 OFFICE O/P NEW MOD 45 MIN: CPT | Performed by: INTERNAL MEDICINE

## 2019-10-07 ASSESSMENT — ENCOUNTER SYMPTOMS
HEADACHES: 0
HEARTBURN: 0
PND: 0
BLURRED VISION: 0
WEIGHT LOSS: 1
PALPITATIONS: 0
SHORTNESS OF BREATH: 0
CHILLS: 0
DIZZINESS: 0
NECK PAIN: 1
DEPRESSION: 0
BRUISES/BLEEDS EASILY: 0
NAUSEA: 0
MYALGIAS: 0
EYE DISCHARGE: 0
COUGH: 0
NERVOUS/ANXIOUS: 0
FEVER: 0

## 2019-10-07 NOTE — LETTER
"     [facility]   [address]  [place] [address]  Phone: [phone]  Fax: [phone]              Linn Mcconnell  1975    Encounter Date: 10/7/2019    Andrei Wren M.D.          PROGRESS NOTE:  Chief Complaint   Patient presents with   • Skipped Beats       Subjective:   Linn Mcconnell is a 44 y.o. female who presents today self-referred for left-sided chest pain for greater than 6 months.  She reports that she used to have a left lateral chest discomfort with some tingling in her left arm intermittently and now it is constant, 24/7.  It is not positional.  It does not vary with breathing.    She does have neck pain and is recently as 5 days ago had her second epidural shot in her neck which helped her neck pain but not her chest pain.    She also describes rare isolated palpitations, nonsustained.    Since January 2019 she has been \"tired\" in the last 3 to 5 months she is lost 15 pounds for unclear reasons.    Prior medical history also includes the fact that she had Kawasaki's disease at age 6 with multiple echocardiograms thereafter and no complications noted    Office EKG is normal    Family history: In addition to what is noted elsewhere in her chart, her 3 children have mitochondrial disease.  One child had ventricular tachycardia at a young age and is taken propranolol for years.  2 children are on constant TPN due to GI complications of mitochondrial disease.    Social history: As noted in the rest of the chart.    Past Medical History:   Diagnosis Date   • Acid reflux    • Anesthesia     PONV   • Asthma     illness induced asthma   • Bowel habit changes     constipation   • Complex regional pain syndrome 08-    left foot, 3/10   • Gynecological disorder     pelvic pain   • Osteopenia    • Painful menstruation     history of   • Pelvic and perineal pain    • Personal history of colonic polyps 4/25/2011   • Psychiatric problem    "    depression   • Vasovagal near syncope 4/25/2011     Past Surgical History:   Procedure Laterality Date   • RECTAL EXPLORATION  8/29/2018    Procedure: RECTAL EXPLORATION-  EUA;  Surgeon: Maldonado Schumacher M.D.;  Location: [facility];  Service: General   • SIGMOIDOSCOPY FLEX  8/29/2018    Procedure: SIGMOIDOSCOPY FLEX-  WITH BIOPSIES;  Surgeon: Maldonado Schumacher M.D.;  Location: SURGERY [facility];  Service: General   • CYSTOSCOPY  6/6/2018    Procedure: CYSTOSCOPY;  Surgeon: Patricia Senior M.D.;  Location: [facility];  Service: Gynecology   • HYSTERECTOMY ROBOTIC  6/6/2018    Procedure: HYSTERECTOMY ROBOTIC;  Surgeon: Patricia Senior M.D.;  Location: [facility];  Service: Gynecology   • SALPINGECTOMY Left 6/6/2018    Procedure: SALPINGECTOMY;  Surgeon: Patricia Senior M.D.;  Location: [facility];  Service: Gynecology   • OOPHORECTOMY Left 6/6/2018    Procedure: OOPHORECTOMY;  Surgeon: Patricia Senior M.D.;  Location: [facility];  Service: Gynecology   • PELVISCOPY  8/24/2017    Procedure: PELVISCOPY- DIAGNOSTIC WITH FULGURATION OF ENDOMETRIOSIS;  Surgeon: Jolie Mclean M.D.;  Location: SURGERY [facility] DAY [facility];  Service:    • SALPINGECTOMY Right 8/24/2017    Procedure: SALPINGECTOMY-OOPHERECTOMY;  Surgeon: Jolie Mclean M.D.;  Location: SURGERY SAME DAY [facility];  Service:    • APPENDECTOMY LAPAROSCOPIC  8/18/2016    Procedure: APPENDECTOMY LAPAROSCOPIC;  Surgeon: Alexander Michele M.D.;  Location: [facility];  Service:    • VULVECTOMY PARTIAL  6/21/2016    Procedure: PARTIAL SIMPLE VULVECTOMY ;  Surgeon: Jolie Mclean M.D.;  Location: SURGERY SAME DAY [facility];  Service:    • PB INJ LUMBAR/SACRAL,W/WO CNTRST  3/9/2016    Procedure: INJ EPI NON NEUROLYTIC L/S L5/ S1;  Surgeon: Alfonso Garcia M.D.;  Location: SURGERY [facility];  Service: Pain Management   • PB FLUORSCOPIC GUIDANCE SPINAL  INJECTION  3/9/2016    Procedure: FLUOROGUIDE FOR SPINAL INJ;  Surgeon: Alfonso Garcia M.D.;  Location: [facility];  Service: Pain Management   • AZ INJ FOR SACROILIAC JT ANESTH Right 11/25/2015    Procedure: INJ, SACROILIAC, ANES/STEROID;  Surgeon: Alfonso Garcia M.D.;  Location: SURGERY [facility];  Service: Pain Management   • OTHER NEUROLOGICAL SURG      spinal cord stimulator   • OTHER ORTHOPEDIC SURGERY Left     hip surgery   • TUBAL COAGULATION LAPAROSCOPIC BILATERAL  5/8/2014    Performed by Jolie Mclean M.D. at SURGERY SAME DAY [facility]   • HYSTEROSCOPY NOVASURE-2  5/8/2014    Performed by Jolie Mclean M.D. at SURGERY SAME DAY [facility]   • OTHER  2014    CRPS   • PRIMARY C SECTION  2/24/08   • PRIMARY C SECTION  6/25/04    twins     Family History   Problem Relation Age of Onset   • Hyperlipidemia Mother    • Cancer Father         lung cancer , non smoker   • Lung Disease Father    • Cancer Maternal Grandmother         thyroid, skin    • Hyperlipidemia Maternal Grandmother    • Stroke Maternal Grandfather    • Diabetes Paternal Grandmother    • Cancer Paternal Grandmother         breast     Social History     Socioeconomic History   • Marital status:      Spouse name: Not on file   • Number of children: Not on file   • Years of education: Not on file   • Highest education level: Not on file   Occupational History   • Not on file   Social Needs   • Financial resource strain: Not on file   • Food insecurity:     Worry: Not on file     Inability: Not on file   • Transportation needs:     Medical: Not on file     Non-medical: Not on file   Tobacco Use   • Smoking status: Never Smoker   • Smokeless tobacco: Never Used   Substance and Sexual Activity   • Alcohol use: No     Alcohol/week: 0.0 oz   • Drug use: No   • Sexual activity: Yes     Partners: Male     Comment:     Lifestyle   • Physical activity:     Days per week: Not on file     Minutes per  "session: Not on file   • Stress: Not on file   Relationships   • Social connections:     Talks on phone: Not on file     Gets together: Not on file     Attends Yazdanism service: Not on file     Active member of club or organization: Not on file     Attends meetings of clubs or organizations: Not on file     Relationship status: Not on file   • Intimate partner violence:     Fear of current or ex partner: Not on file     Emotionally abused: Not on file     Physically abused: Not on file     Forced sexual activity: Not on file   Other Topics Concern   • Not on file   Social History Narrative   • Not on file     Allergies   Allergen Reactions   • Fentanyl Vomiting     vomiting   • Flagyl [Metronidazole] Vomiting     \"non-stop vomiting\"   • Gluten Meal Nausea     nausea   • Lactated Ringers      Family history of mitochondrial disease   • Norco [Apap-Fd&C Yellow #10 [place]-Hydrocodone] Vomiting     vomiting   • Percocet [Apap-Fd&C Red #40 [place]-Oxycodone] Vomiting     vomiting   • Phenergan [Promethazine] Nausea     nausea   • Scopolamine Unspecified     Blurry eyes/dizzy     • Vicodin [Hydrocodone-Acetaminophen] Vomiting     vomiting     Outpatient Encounter Medications as of 10/7/2019   Medication Sig Dispense Refill   • Estradiol-Estriol-Progesterone (BIEST/PROGESTERONE TD) Place 0.05 mL under tongue 2 Times a Day.     • buPROPion SR (WELLBUTRIN-SR) 150 MG TABLET SR 12 HR sustained-release tablet Take 150 mg by mouth 2 times a day.     • topiramate (TOPAMAX) 25 MG Tab Take 25 mg by mouth every day.     • Ascorbic Acid (VITAMIN C) 1000 MG Tab Take 1,000 mg by mouth 2 Times a Day.       No facility-administered encounter medications on file as of 10/7/2019.      Review of Systems   Constitutional: Positive for malaise/fatigue and weight loss. Negative for chills and fever.   Eyes: Negative for blurred vision and discharge.   Respiratory: Negative for cough and shortness of breath.    Cardiovascular: Positive for " "chest pain. Negative for palpitations, leg swelling and PND.   Gastrointestinal: Negative for heartburn and nausea.   Genitourinary: Negative for dysuria and urgency.   Musculoskeletal: Positive for neck pain. Negative for myalgias.        Regional pain left leg, in part helped by a spinal stimulator.  Diagnosis of complex regional pain syndrome   Skin: Negative for itching and rash.   Neurological: Negative for dizziness and headaches.   Endo/Heme/Allergies: Negative for environmental allergies. Does not bruise/bleed easily.   Psychiatric/Behavioral: Negative for depression. The patient is not nervous/anxious.         Objective:   /62 (BP Location: Left arm, Patient Position: Sitting, BP Cuff Size: Adult)   Pulse 60   Ht 1.626 m (5' 4\")   Wt 49.4 kg (109 lb)   LMP 06/06/2018 (Approximate)   BMI 18.71 kg/m²      Physical Exam   Constitutional: She is oriented to person, place, and time.   Thin pleasant lady who is a good historian   Neck: JVD present.   Cardiovascular: Normal rate and regular rhythm. Exam reveals no gallop and no friction rub.   No murmur heard.  Pulmonary/Chest: Effort normal and breath sounds normal.   Abdominal: Soft. There is no tenderness.   Musculoskeletal: She exhibits no edema.   Neurological: She is alert and oriented to person, place, and time.   Skin: Skin is warm and dry.       Assessment:     1. Skipped beats  EKG    EC-ECHOCARDIOGRAM COMPLETE W/O CONT   2. Chest pain, unspecified type  DX-CHEST-2 VIEWS    EC-ECHOCARDIOGRAM COMPLETE W/O CONT       Medical Decision Making:  Today's Assessment / Status / Plan:   Chest x-ray ordered  Echocardiogram ordered    She may need a CAT scan of her chest particularly with a history of Kawasaki's disease as a young child, looking for any unusual coronary artery aneurysms.    Close follow-up      John Santiago M.D.  [address] [place] [id number]  VIA Facsimile: [phone]                 "

## 2019-10-07 NOTE — PROGRESS NOTES
"Chief Complaint   Patient presents with   • Skipped Beats       Subjective:   Linn Mcconnell is a 44 y.o. female who presents today self-referred for left-sided chest pain for greater than 6 months.  She reports that she used to have a left lateral chest discomfort with some tingling in her left arm intermittently and now it is constant, 24/7.  It is not positional.  It does not vary with breathing.    She does have neck pain and is recently as 5 days ago had her second epidural shot in her neck which helped her neck pain but not her chest pain.    She also describes rare isolated palpitations, nonsustained.    Since January 2019 she has been \"tired\" in the last 3 to 5 months she is lost 15 pounds for unclear reasons.    Prior medical history also includes the fact that she had Kawasaki's disease at age 6 with multiple echocardiograms thereafter and no complications noted    Office EKG is normal    Family history: In addition to what is noted elsewhere in her chart, her 3 children have mitochondrial disease.  One child had ventricular tachycardia at a young age and is taken propranolol for years.  2 children are on constant TPN due to GI complications of mitochondrial disease.    Social history: As noted in the rest of the chart.    Past Medical History:   Diagnosis Date   • Acid reflux    • Anesthesia     PONV   • Asthma     illness induced asthma   • Bowel habit changes     constipation   • Complex regional pain syndrome 08-    left foot, 3/10   • Gynecological disorder     pelvic pain   • Osteopenia    • Painful menstruation     history of   • Pelvic and perineal pain    • Personal history of colonic polyps 4/25/2011   • Psychiatric problem     depression   • Vasovagal near syncope 4/25/2011     Past Surgical History:   Procedure Laterality Date   • RECTAL EXPLORATION  8/29/2018    Procedure: RECTAL EXPLORATION-  EUA;  Surgeon: Maldonado Schumacher M.D.;  Location: SURGERY [facility];  Service: General   • " SIGMOIDOSCOPY FLEX  8/29/2018    Procedure: SIGMOIDOSCOPY FLEX-  WITH BIOPSIES;  Surgeon: Maldonado Schumacher M.D.;  [place] [facility]  Service: General   • CYSTOSCOPY  6/6/2018    Procedure: CYSTOSCOPY;  Surgeon: Patricia Senior M.D.;  [place] [facility]  Service: Gynecology   • HYSTERECTOMY ROBOTIC  6/6/2018    Procedure: HYSTERECTOMY ROBOTIC;  Surgeon: Patricia Senior M.D.;  [place] [facility]  Service: Gynecology   • SALPINGECTOMY Left 6/6/2018    Procedure: SALPINGECTOMY;  Surgeon: Patricia Senior M.D.;  [place] [facility]  Service: Gynecology   • OOPHORECTOMY Left 6/6/2018    Procedure: OOPHORECTOMY;  Surgeon: Patricia Senior M.D.;  [place] [facility]  Service: Gynecology   • PELVISCOPY  8/24/2017    Procedure: PELVISCOPY- DIAGNOSTIC WITH FULGURATION OF ENDOMETRIOSIS;  Surgeon: Jolie Mclean M.D.;  Location: SURGERY [facility] DAY [facility];  Service:    • SALPINGECTOMY Right 8/24/2017    Procedure: SALPINGECTOMY-OOPHERECTOMY;  Surgeon: Jolie Mclean M.D.;  Location: SURGERY [facility] DAY [facility];  Service:    • APPENDECTOMY LAPAROSCOPIC  8/18/2016    Procedure: APPENDECTOMY LAPAROSCOPIC;  Surgeon: Alexander Michele M.D.;  [place] [facility]  Service:    • VULVECTOMY PARTIAL  6/21/2016    Procedure: PARTIAL SIMPLE VULVECTOMY ;  Surgeon: Jolie Mclean M.D.;  Location: SURGERY [facility] DAY [facility];  Service:    • PB INJ LUMBAR/SACRAL,W/WO CNTRST  3/9/2016    Procedure: INJ EPI NON NEUROLYTIC L/S L5/ S1;  Surgeon: Alfonso Garcia M.D.;  Location: [facility];  Service: Pain Management   • PB FLUORSCOPIC GUIDANCE SPINAL INJECTION  3/9/2016    Procedure: FLUOROGUIDE FOR SPINAL INJ;  Surgeon: Alfonso Garcia M.D.;  Location: [facility];  Service: Pain Management   • OH INJ FOR SACROILIAC JT ANESTH Right 11/25/2015    Procedure: INJ, SACROILIAC, ANES/STEROID;  Surgeon: Alfonso MAYERS  LUKAS Garcia;  Location: [facility];  Service: Pain Management   • OTHER NEUROLOGICAL SURG      spinal cord stimulator   • OTHER ORTHOPEDIC SURGERY Left     hip surgery   • TUBAL COAGULATION LAPAROSCOPIC BILATERAL  5/8/2014    Performed by Jolie Mclean M.D. at SURGERY SAME DAY [facility]S   • HYSTEROSCOPY NOVASURE-2  5/8/2014    Performed by Jolie Mclean M.D. at SURGERY SAME DAY [facility]S   • OTHER  2014    CRPS   • PRIMARY C SECTION  2/24/08   • PRIMARY C SECTION  6/25/04    twins     Family History   Problem Relation Age of Onset   • Hyperlipidemia Mother    • Cancer Father         lung cancer , non smoker   • Lung Disease Father    • Cancer Maternal Grandmother         thyroid, skin    • Hyperlipidemia Maternal Grandmother    • Stroke Maternal Grandfather    • Diabetes Paternal Grandmother    • Cancer Paternal Grandmother         breast     Social History     Socioeconomic History   • Marital status:      Spouse name: Not on file   • Number of children: Not on file   • Years of education: Not on file   • Highest education level: Not on file   Occupational History   • Not on file   Social Needs   • Financial resource strain: Not on file   • Food insecurity:     Worry: Not on file     Inability: Not on file   • Transportation needs:     Medical: Not on file     Non-medical: Not on file   Tobacco Use   • Smoking status: Never Smoker   • Smokeless tobacco: Never Used   Substance and Sexual Activity   • Alcohol use: No     Alcohol/week: 0.0 oz   • Drug use: No   • Sexual activity: Yes     Partners: Male     Comment:     Lifestyle   • Physical activity:     Days per week: Not on file     Minutes per session: Not on file   • Stress: Not on file   Relationships   • Social connections:     Talks on phone: Not on file     Gets together: Not on file     Attends Oriental orthodox service: Not on file     Active member of club or organization: Not on file     Attends meetings of clubs  "or organizations: Not on file     Relationship status: Not on file   • Intimate partner violence:     Fear of current or ex partner: Not on file     Emotionally abused: Not on file     Physically abused: Not on file     Forced sexual activity: Not on file   Other Topics Concern   • Not on file   Social History Narrative   • Not on file     Allergies   Allergen Reactions   • Fentanyl Vomiting     vomiting   • Flagyl [Metronidazole] Vomiting     \"non-stop vomiting\"   • Gluten Meal Nausea     nausea   • Lactated Ringers      Family history of mitochondrial disease   • Norco [Apap-Fd&C Yellow #10 [place]-Hydrocodone] Vomiting     vomiting   • Percocet [Apap-Fd&C Red #40 [place]-Oxycodone] Vomiting     vomiting   • Phenergan [Promethazine] Nausea     nausea   • Scopolamine Unspecified     Blurry eyes/dizzy     • Vicodin [Hydrocodone-Acetaminophen] Vomiting     vomiting     Outpatient Encounter Medications as of 10/7/2019   Medication Sig Dispense Refill   • Estradiol-Estriol-Progesterone (BIEST/PROGESTERONE TD) Place 0.05 mL under tongue 2 Times a Day.     • buPROPion SR (WELLBUTRIN-SR) 150 MG TABLET SR 12 HR sustained-release tablet Take 150 mg by mouth 2 times a day.     • topiramate (TOPAMAX) 25 MG Tab Take 25 mg by mouth every day.     • Ascorbic Acid (VITAMIN C) 1000 MG Tab Take 1,000 mg by mouth 2 Times a Day.       No facility-administered encounter medications on file as of 10/7/2019.      Review of Systems   Constitutional: Positive for malaise/fatigue and weight loss. Negative for chills and fever.   Eyes: Negative for blurred vision and discharge.   Respiratory: Negative for cough and shortness of breath.    Cardiovascular: Positive for chest pain. Negative for palpitations, leg swelling and PND.   Gastrointestinal: Negative for heartburn and nausea.   Genitourinary: Negative for dysuria and urgency.   Musculoskeletal: Positive for neck pain. Negative for myalgias.        Regional pain left leg, in part helped " "by a spinal stimulator.  Diagnosis of complex regional pain syndrome   Skin: Negative for itching and rash.   Neurological: Negative for dizziness and headaches.   Endo/Heme/Allergies: Negative for environmental allergies. Does not bruise/bleed easily.   Psychiatric/Behavioral: Negative for depression. The patient is not nervous/anxious.         Objective:   /62 (BP Location: Left arm, Patient Position: Sitting, BP Cuff Size: Adult)   Pulse 60   Ht 1.626 m (5' 4\")   Wt 49.4 kg (109 lb)   LMP 06/06/2018 (Approximate)   BMI 18.71 kg/m²     Physical Exam   Constitutional: She is oriented to person, place, and time.   Thin pleasant lady who is a good historian   Neck: JVD present.   Cardiovascular: Normal rate and regular rhythm. Exam reveals no gallop and no friction rub.   No murmur heard.  Pulmonary/Chest: Effort normal and breath sounds normal.   Abdominal: Soft. There is no tenderness.   Musculoskeletal: She exhibits no edema.   Neurological: She is alert and oriented to person, place, and time.   Skin: Skin is warm and dry.       Assessment:     1. Skipped beats  EKG    EC-ECHOCARDIOGRAM COMPLETE W/O CONT   2. Chest pain, unspecified type  DX-CHEST-2 VIEWS    EC-ECHOCARDIOGRAM COMPLETE W/O CONT       Medical Decision Making:  Today's Assessment / Status / Plan:   Chest x-ray ordered  Echocardiogram ordered    She may need a CAT scan of her chest particularly with a history of Kawasaki's disease as a young child, looking for any unusual coronary artery aneurysms.    Close follow-up  "

## 2019-10-10 ENCOUNTER — APPOINTMENT (OUTPATIENT)
Dept: RADIOLOGY | Facility: MEDICAL CENTER | Age: 44
End: 2019-10-10
Attending: EMERGENCY MEDICINE
Payer: COMMERCIAL

## 2019-10-10 ENCOUNTER — HOSPITAL ENCOUNTER (EMERGENCY)
Facility: MEDICAL CENTER | Age: 44
End: 2019-10-10
Attending: EMERGENCY MEDICINE
Payer: COMMERCIAL

## 2019-10-10 VITALS
RESPIRATION RATE: 12 BRPM | HEIGHT: 64 IN | TEMPERATURE: 97.2 F | HEART RATE: 65 BPM | WEIGHT: 112.88 LBS | DIASTOLIC BLOOD PRESSURE: 70 MMHG | BODY MASS INDEX: 19.27 KG/M2 | OXYGEN SATURATION: 98 % | SYSTOLIC BLOOD PRESSURE: 110 MMHG

## 2019-10-10 DIAGNOSIS — R07.9 CHEST PAIN, UNSPECIFIED TYPE: ICD-10-CM

## 2019-10-10 DIAGNOSIS — M79.602 LEFT ARM PAIN: ICD-10-CM

## 2019-10-10 LAB
ALBUMIN SERPL BCP-MCNC: 4.5 G/DL (ref 3.2–4.9)
ALBUMIN/GLOB SERPL: 1.8 G/DL
ALP SERPL-CCNC: 47 U/L (ref 30–99)
ALT SERPL-CCNC: 14 U/L (ref 2–50)
ANION GAP SERPL CALC-SCNC: 7 MMOL/L (ref 0–11.9)
AST SERPL-CCNC: 15 U/L (ref 12–45)
BASOPHILS # BLD AUTO: 1.6 % (ref 0–1.8)
BASOPHILS # BLD: 0.08 K/UL (ref 0–0.12)
BILIRUB SERPL-MCNC: 0.4 MG/DL (ref 0.1–1.5)
BUN SERPL-MCNC: 9 MG/DL (ref 8–22)
CALCIUM SERPL-MCNC: 9.4 MG/DL (ref 8.5–10.5)
CHLORIDE SERPL-SCNC: 108 MMOL/L (ref 96–112)
CO2 SERPL-SCNC: 26 MMOL/L (ref 20–33)
CREAT SERPL-MCNC: 0.98 MG/DL (ref 0.5–1.4)
EKG IMPRESSION: NORMAL
EOSINOPHIL # BLD AUTO: 0.45 K/UL (ref 0–0.51)
EOSINOPHIL NFR BLD: 9 % (ref 0–6.9)
ERYTHROCYTE [DISTWIDTH] IN BLOOD BY AUTOMATED COUNT: 41.1 FL (ref 35.9–50)
GLOBULIN SER CALC-MCNC: 2.5 G/DL (ref 1.9–3.5)
GLUCOSE SERPL-MCNC: 86 MG/DL (ref 65–99)
HCT VFR BLD AUTO: 43.6 % (ref 37–47)
HGB BLD-MCNC: 14.6 G/DL (ref 12–16)
IMM GRANULOCYTES # BLD AUTO: 0.01 K/UL (ref 0–0.11)
IMM GRANULOCYTES NFR BLD AUTO: 0.2 % (ref 0–0.9)
LYMPHOCYTES # BLD AUTO: 1.73 K/UL (ref 1–4.8)
LYMPHOCYTES NFR BLD: 34.6 % (ref 22–41)
MCH RBC QN AUTO: 31.8 PG (ref 27–33)
MCHC RBC AUTO-ENTMCNC: 33.5 G/DL (ref 33.6–35)
MCV RBC AUTO: 95 FL (ref 81.4–97.8)
MONOCYTES # BLD AUTO: 0.26 K/UL (ref 0–0.85)
MONOCYTES NFR BLD AUTO: 5.2 % (ref 0–13.4)
NEUTROPHILS # BLD AUTO: 2.47 K/UL (ref 2–7.15)
NEUTROPHILS NFR BLD: 49.4 % (ref 44–72)
NRBC # BLD AUTO: 0 K/UL
NRBC BLD-RTO: 0 /100 WBC
PLATELET # BLD AUTO: 273 K/UL (ref 164–446)
PMV BLD AUTO: 9.8 FL (ref 9–12.9)
POTASSIUM SERPL-SCNC: 3.5 MMOL/L (ref 3.6–5.5)
PROT SERPL-MCNC: 7 G/DL (ref 6–8.2)
RBC # BLD AUTO: 4.59 M/UL (ref 4.2–5.4)
SODIUM SERPL-SCNC: 141 MMOL/L (ref 135–145)
TROPONIN T SERPL-MCNC: <6 NG/L (ref 6–19)
WBC # BLD AUTO: 5 K/UL (ref 4.8–10.8)

## 2019-10-10 PROCEDURE — 93005 ELECTROCARDIOGRAM TRACING: CPT | Performed by: EMERGENCY MEDICINE

## 2019-10-10 PROCEDURE — 71045 X-RAY EXAM CHEST 1 VIEW: CPT

## 2019-10-10 PROCEDURE — 85025 COMPLETE CBC W/AUTO DIFF WBC: CPT

## 2019-10-10 PROCEDURE — 700117 HCHG RX CONTRAST REV CODE 255: Performed by: EMERGENCY MEDICINE

## 2019-10-10 PROCEDURE — 96374 THER/PROPH/DIAG INJ IV PUSH: CPT

## 2019-10-10 PROCEDURE — 36415 COLL VENOUS BLD VENIPUNCTURE: CPT

## 2019-10-10 PROCEDURE — 84484 ASSAY OF TROPONIN QUANT: CPT

## 2019-10-10 PROCEDURE — 71260 CT THORAX DX C+: CPT

## 2019-10-10 PROCEDURE — 80053 COMPREHEN METABOLIC PANEL: CPT

## 2019-10-10 PROCEDURE — 99284 EMERGENCY DEPT VISIT MOD MDM: CPT

## 2019-10-10 PROCEDURE — 73206 CT ANGIO UPR EXTRM W/O&W/DYE: CPT | Mod: LT

## 2019-10-10 PROCEDURE — 700111 HCHG RX REV CODE 636 W/ 250 OVERRIDE (IP): Performed by: EMERGENCY MEDICINE

## 2019-10-10 PROCEDURE — 96375 TX/PRO/DX INJ NEW DRUG ADDON: CPT

## 2019-10-10 PROCEDURE — 93005 ELECTROCARDIOGRAM TRACING: CPT

## 2019-10-10 RX ORDER — DIPHENHYDRAMINE HYDROCHLORIDE 50 MG/ML
25 INJECTION INTRAMUSCULAR; INTRAVENOUS ONCE
Status: COMPLETED | OUTPATIENT
Start: 2019-10-10 | End: 2019-10-10

## 2019-10-10 RX ORDER — METHYLPREDNISOLONE SODIUM SUCCINATE 125 MG/2ML
125 INJECTION, POWDER, LYOPHILIZED, FOR SOLUTION INTRAMUSCULAR; INTRAVENOUS ONCE
Status: COMPLETED | OUTPATIENT
Start: 2019-10-10 | End: 2019-10-10

## 2019-10-10 RX ADMIN — IOHEXOL 80 ML: 350 INJECTION, SOLUTION INTRAVENOUS at 20:40

## 2019-10-10 RX ADMIN — METHYLPREDNISOLONE SODIUM SUCCINATE 125 MG: 125 INJECTION, POWDER, FOR SOLUTION INTRAMUSCULAR; INTRAVENOUS at 20:15

## 2019-10-10 RX ADMIN — DIPHENHYDRAMINE HYDROCHLORIDE 25 MG: 50 INJECTION INTRAMUSCULAR; INTRAVENOUS at 20:15

## 2019-10-10 NOTE — ED TRIAGE NOTES
Linn Mcconnell  44 y.o.  Chief Complaint   Patient presents with   • Chest Pain     ongoing for weeks intermittent, constant today, left side radiating down left arm.  hx neck problems, had epidural last week     Patient ambulatory with steady gait to triage room with above complaint.  Patient appears in mild discomfort.  Has been seeing multiple doctors for her neck problems trying to figure out what is wrong.  States this CP is different and worse with the pain in the arm.      EKG ordered.  Patient escorted to the lobby and instructed to notify staff of any changes in condition.

## 2019-10-11 NOTE — ED NOTES
Pt ambulatory to green pod.  Agree with triage assessment.  Pt states hx of chest pain as well, has seen several providers and has ECHO scheduled.  Pt states arm pain is new symptom.

## 2019-10-11 NOTE — ED PROVIDER NOTES
ED Provider Note    CHIEF COMPLAINT  Chief Complaint   Patient presents with   • Chest Pain     ongoing for weeks intermittent, constant today, left side radiating down left arm.  hx neck problems, had epidural last week       HPI  Linn Mcconnell is a 44 y.o. female with multiple medical problems a history of complex regional pain syndrome to the left leg who presents with complaints of continued left chest pain with development of left arm pain, along with some numbness and tingling to the arm.  Patient has been having left-sided chest pain along with radiation to the left arm with numbness and tingling for the past 6 months.  She has a history of neck problems and has had previous facet blocks and trigger point injections.  She initially thought the chest pain and arm pain was secondary to her neck problems.  She describes the pain as severe and constant, without any worsening of pain with movement or deep inspiration.  She has had some numbness and tingling to the fourth and fifth fingers to the left hand for some time.  She has had 2 epidural injections to her neck, and the chest pain has improved however she notes that it has had no effect on her left arm pain.  She saw Dr. Wren of cardiology 3 days ago because of the chest pain.  She says she has an echocardiogram scheduled for later this month.  She was told that he did not think the pain was due to her heart because the pain was constant, and not worsened with exertional activity.  She says that last night and today the pain became worse, and at one point this afternoon her arm and hand became cold and and her hand and fingers became blue in color.  This resolved after 10 or 15 minutes.      REVIEW OF SYSTEMS  See HPI for further details. All other systems are negative.     PAST MEDICAL HISTORY  Past Medical History:   Diagnosis Date   • Acid reflux    • Anesthesia     PONV   • Asthma     illness induced asthma   • Bowel habit changes     constipation    • Complex regional pain syndrome 08-    left foot, 3/10   • Gynecological disorder     pelvic pain   • Osteopenia    • Painful menstruation     history of   • Pelvic and perineal pain    • Personal history of colonic polyps 4/25/2011   • Psychiatric problem     depression   • Vasovagal near syncope 4/25/2011       FAMILY HISTORY  Family History   Problem Relation Age of Onset   • Hyperlipidemia Mother    • Cancer Father         lung cancer , non smoker   • Lung Disease Father    • Cancer Maternal Grandmother         thyroid, skin    • Hyperlipidemia Maternal Grandmother    • Stroke Maternal Grandfather    • Diabetes Paternal Grandmother    • Cancer Paternal Grandmother         breast       SOCIAL HISTORY  Social History     Socioeconomic History   • Marital status:      Spouse name: Not on file   • Number of children: Not on file   • Years of education: Not on file   • Highest education level: Not on file   Occupational History   • Not on file   Social Needs   • Financial resource strain: Not on file   • Food insecurity:     Worry: Not on file     Inability: Not on file   • Transportation needs:     Medical: Not on file     Non-medical: Not on file   Tobacco Use   • Smoking status: Never Smoker   • Smokeless tobacco: Never Used   Substance and Sexual Activity   • Alcohol use: No     Alcohol/week: 0.0 oz   • Drug use: No   • Sexual activity: Yes     Partners: Male     Comment:     Lifestyle   • Physical activity:     Days per week: Not on file     Minutes per session: Not on file   • Stress: Not on file   Relationships   • Social connections:     Talks on phone: Not on file     Gets together: Not on file     Attends Judaism service: Not on file     Active member of club or organization: Not on file     Attends meetings of clubs or organizations: Not on file     Relationship status: Not on file   • Intimate partner violence:     Fear of current or ex partner: Not on file     Emotionally abused: Not  on file     Physically abused: Not on file     Forced sexual activity: Not on file   Other Topics Concern   • Not on file   Social History Narrative   • Not on file       SURGICAL HISTORY  Past Surgical History:   Procedure Laterality Date   • RECTAL EXPLORATION  8/29/2018    Procedure: RECTAL EXPLORATION-  EUA;  Surgeon: Maldonado Schumacher M.D.;  Location: SURGERY [facility];  Service: General   • SIGMOIDOSCOPY FLEX  8/29/2018    Procedure: SIGMOIDOSCOPY FLEX-  WITH BIOPSIES;  Surgeon: Maldonado Schumacher M.D.;  Location: SURGERY [facility];  Service: General   • CYSTOSCOPY  6/6/2018    Procedure: CYSTOSCOPY;  Surgeon: Patricia Senior M.D.;  Location: SURGERY [facility];  Service: Gynecology   • HYSTERECTOMY ROBOTIC  6/6/2018    Procedure: HYSTERECTOMY ROBOTIC;  Surgeon: Patricia Senior M.D.;  Location: SURGERY [facility];  Service: Gynecology   • SALPINGECTOMY Left 6/6/2018    Procedure: SALPINGECTOMY;  Surgeon: Patricia Senior M.D.;  Location: SURGERY [facility];  Service: Gynecology   • OOPHORECTOMY Left 6/6/2018    Procedure: OOPHORECTOMY;  Surgeon: Patricia Senior M.D.;  Location: SURGERY [facility];  Service: Gynecology   • PELVISCOPY  8/24/2017    Procedure: PELVISCOPY- DIAGNOSTIC WITH FULGURATION OF ENDOMETRIOSIS;  Surgeon: Jolie Mclean M.D.;  Location: SURGERY [facility] DAY [facility];  Service:    • SALPINGECTOMY Right 8/24/2017    Procedure: SALPINGECTOMY-OOPHERECTOMY;  Surgeon: Jolie Mclean M.D.;  Location: SURGERY [facility];  Service:    • APPENDECTOMY LAPAROSCOPIC  8/18/2016    Procedure: APPENDECTOMY LAPAROSCOPIC;  Surgeon: Alexander Michele M.D.;  Location: SURGERY [facility];  Service:    • VULVECTOMY PARTIAL  6/21/2016    Procedure: PARTIAL SIMPLE VULVECTOMY ;  Surgeon: Jolie Mclean M.D.;  Location: SURGERY [facility];  Service:    • PB INJ LUMBAR/SACRAL,W/WO CNTRST  3/9/2016    Procedure: INJ EPI NON NEUROLYTIC L/S L5/ S1;   "Surgeon: Alfonso Garcia M.D.;  Location: SURGERY [facility];  Service: Pain Management   • PB FLUORSCOPIC GUIDANCE SPINAL INJECTION  3/9/2016    Procedure: FLUOROGUIDE FOR SPINAL INJ;  Surgeon: Alfonso Garcia M.D.;  Location: SURGERY [facility];  Service: Pain Management   • MD INJ FOR SACROILIAC JT ANESTH Right 11/25/2015    Procedure: INJ, SACROILIAC, ANES/STEROID;  Surgeon: Alfonso Garcia M.D.;  Location: SURGERY [facility];  Service: Pain Management   • OTHER NEUROLOGICAL SURG      spinal cord stimulator   • OTHER ORTHOPEDIC SURGERY Left     hip surgery   • TUBAL COAGULATION LAPAROSCOPIC BILATERAL  5/8/2014    Performed by Jolie Mclean M.D. at SURGERY SAME DAY [facility]   • HYSTEROSCOPY NOVASURE-2  5/8/2014    Performed by Jolie Mclean M.D. at SURGERY SAME DAY [facility]   • OTHER  2014    CRPS   • PRIMARY C SECTION  2/24/08   • PRIMARY C SECTION  6/25/04    twins       CURRENT MEDICATIONS  Home Medications     Reviewed by Oneil Stanton R.N. (Registered Nurse) on 10/10/19 at 1656  Med List Status: Complete    Medication Last Dose Status    Ascorbic Acid (VITAMIN C) 1000 MG Tab None Active    buPROPion SR (WELLBUTRIN-SR) 150 MG TABLET SR 12 HR sustained-release tablet None Active    Estradiol-Estriol-Progesterone (BIEST/PROGESTERONE TD) None Active    topiramate (TOPAMAX) 25 MG Tab None Active                ALLERGIES  Allergies   Allergen Reactions   • Fentanyl Vomiting     vomiting   • Flagyl [Metronidazole] Vomiting     \"non-stop vomiting\"   • Gluten Meal Nausea     nausea   • Lactated Ringers      Family history of mitochondrial disease   • Norco [Apap-Fd&C Yellow #10 [place]-Hydrocodone] Vomiting     vomiting   • Percocet [Apap-Fd&C Red #40 [place]-Oxycodone] Vomiting     vomiting   • Phenergan [Promethazine] Nausea     nausea   • Scopolamine Unspecified     Blurry eyes/dizzy     • Vicodin [Hydrocodone-Acetaminophen] Vomiting     vomiting       PHYSICAL " "EXAM  VITAL SIGNS: Blood Pressure 104/68   Pulse 60   Temperature 36.2 °C (97.2 °F) (Temporal)   Respiration 12   Height 1.626 m (5' 4\")   Weight 51.2 kg (112 lb 14 oz)   Last Menstrual Period 06/06/2018 (Approximate)   Oxygen Saturation 100%   Body Mass Index 19.38 kg/m²   Constitutional: Awake, alert, in no acute distress, Non-toxic appearance.   HENT: Atraumatic. Bilateral external ears normal, mucous membranes moist, throat nonerythematous without exudates, nose is normal.  Eyes: PERRL, EOMI, conjunctiva moist, noninjected.  Neck: Nontender, Normal range of motion, No nuchal rigidity, No stridor.   Lymphatic: No lymphadenopathy noted.   Cardiovascular: Regular rate and rhythm, no murmurs, rubs, gallops.  Thorax & Lungs:  Good breath sounds bilaterally, no wheezes, rales, or retractions.  No chest tenderness.  Abdomen: Bowel sounds normal, Soft, nontender, nondistended, no rebound, guarding, masses.  Back: No CVA or spinal tenderness.  Extremities: Intact distal pulses, No edema, No tenderness.  The left arm shows no obvious swelling, erythema, induration, or cyanosis.  There is a very good radial pulse, with good capillary refill to the fingertips, with subjective numbness to the fourth and fifth fingers.  The entire arm is warm without any swelling, erythema, or induration.  There is good range of motion on flexion/extension at all joints to the arm, hand, and fingers.  Skin: Warm, Dry, No rashes.  No cyst.  Musculoskeletal: No joint swelling or tenderness.  Neurologic: Alert & oriented x 3, cranial nerves II through XII intact, sensory appears intact except to the left hand as noted above, and motor function shows 5/5 strength to the upper and lower extremities, no focal deficits.  Psychiatric: Affect normal, Judgment normal, Mood normal.     EKG  Twelve-lead EKG shows normal sinus rhythm, rate of 61, normal intervals, normal axis, no acute ST wave elevations or ST depressions, no pathologic Q waves, " no evidence of acute injury or ischemic pattern on my reading, in comparison to previous EKG from October 7, 2019 there are no acute changes.        RADIOLOGY/PROCEDURES  CT-CTA UPPER EXT WITH & W/O-POST PROCESS LEFT   Final Result         1.  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 Otherwise unremarkable CT angiogram of the left upper extremity.   2.  No acute traumatic bony injury.      CT-CHEST (THORAX) WITH   Final Result         1.  Negative CT scan of the chest with contrast.   2.  Hepatomegaly            DX-CHEST-PORTABLE (1 VIEW)   Final Result      No acute cardiac or pulmonary abnormality is noted.            COURSE & MEDICAL DECISION MAKING  Pertinent Labs & Imaging studies reviewed. (See chart for details)  The patient presents with the above complaints.  Her complaints of the arm becoming cold and blue are very concerning for possible ischemic events.  At this time her left upper extremity is warm with good cap refill and a good radial pulse.  EKG shows a normal sinus rhythm.  Chest x-ray shows no acute cardiac or pulmonary abnormalities.  CBC is normal with white count 5000, normal differential, hemoglobin 14.6, chemistry shows potassium 3.5, otherwise normal, troponin less than 6.  CT scan of the chest IV contrast showed no acute abnormalities. CT angiogram of the left upper extremity showed good flow through the subclavian, axillary, brachial, radial, ulnar arteries.  The proximal palmar arch appears intact.  The distal palmar arch appears incomplete and was thought to likely be a congenital variant.  Findings were discussed with the patient and her .  I am uncertain why her hand and fingers turn blue and cold, but possibly could be due to vasospasm.  Patient is told to return to the ER for any recurrent symptoms, worsening chest pain or arm pain, shortness of breath, dizziness, or any other problems.  She is to follow-up with her PCP  tomorrow.    FINAL IMPRESSION  1.  Chest pain  2.  Left arm pain  3.          Electronically signed by: Jose Luis Dejesus, 10/10/2019 8:09 PM

## 2019-10-23 ENCOUNTER — ANESTHESIA EVENT (OUTPATIENT)
Dept: SURGERY | Facility: MEDICAL CENTER | Age: 44
End: 2019-10-23
Payer: COMMERCIAL

## 2019-10-23 ENCOUNTER — HOSPITAL ENCOUNTER (OUTPATIENT)
Facility: MEDICAL CENTER | Age: 44
End: 2019-10-23
Attending: SURGERY | Admitting: SURGERY
Payer: COMMERCIAL

## 2019-10-23 ENCOUNTER — ANESTHESIA (OUTPATIENT)
Dept: SURGERY | Facility: MEDICAL CENTER | Age: 44
End: 2019-10-23
Payer: COMMERCIAL

## 2019-10-23 VITALS
HEIGHT: 64 IN | DIASTOLIC BLOOD PRESSURE: 59 MMHG | RESPIRATION RATE: 16 BRPM | TEMPERATURE: 97.7 F | OXYGEN SATURATION: 100 % | BODY MASS INDEX: 18.74 KG/M2 | SYSTOLIC BLOOD PRESSURE: 96 MMHG | HEART RATE: 70 BPM | WEIGHT: 109.79 LBS

## 2019-10-23 PROBLEM — R59.0 LYMPHADENOPATHY, INGUINAL: Status: ACTIVE | Noted: 2019-10-23

## 2019-10-23 PROBLEM — R63.4 ABNORMAL WEIGHT LOSS: Status: ACTIVE | Noted: 2019-10-23

## 2019-10-23 LAB
PATHOLOGY CONSULT NOTE: NORMAL
PATHOLOGY CONSULT NOTE: NORMAL

## 2019-10-23 PROCEDURE — 700101 HCHG RX REV CODE 250: Performed by: SURGERY

## 2019-10-23 PROCEDURE — 160046 HCHG PACU - 1ST 60 MINS PHASE II: Performed by: SURGERY

## 2019-10-23 PROCEDURE — 160002 HCHG RECOVERY MINUTES (STAT): Performed by: SURGERY

## 2019-10-23 PROCEDURE — 160035 HCHG PACU - 1ST 60 MINS PHASE I: Performed by: SURGERY

## 2019-10-23 PROCEDURE — 160009 HCHG ANES TIME/MIN: Performed by: SURGERY

## 2019-10-23 PROCEDURE — A6402 STERILE GAUZE <= 16 SQ IN: HCPCS | Performed by: SURGERY

## 2019-10-23 PROCEDURE — 160041 HCHG SURGERY MINUTES - EA ADDL 1 MIN LEVEL 4: Performed by: SURGERY

## 2019-10-23 PROCEDURE — 700111 HCHG RX REV CODE 636 W/ 250 OVERRIDE (IP): Performed by: ANESTHESIOLOGY

## 2019-10-23 PROCEDURE — 160036 HCHG PACU - EA ADDL 30 MINS PHASE I: Performed by: SURGERY

## 2019-10-23 PROCEDURE — 160025 RECOVERY II MINUTES (STATS): Performed by: SURGERY

## 2019-10-23 PROCEDURE — 88307 TISSUE EXAM BY PATHOLOGIST: CPT

## 2019-10-23 PROCEDURE — 501838 HCHG SUTURE GENERAL: Performed by: SURGERY

## 2019-10-23 PROCEDURE — 700105 HCHG RX REV CODE 258: Performed by: ANESTHESIOLOGY

## 2019-10-23 PROCEDURE — 160048 HCHG OR STATISTICAL LEVEL 1-5: Performed by: SURGERY

## 2019-10-23 PROCEDURE — 160029 HCHG SURGERY MINUTES - 1ST 30 MINS LEVEL 4: Performed by: SURGERY

## 2019-10-23 PROCEDURE — 700105 HCHG RX REV CODE 258

## 2019-10-23 PROCEDURE — 700101 HCHG RX REV CODE 250: Performed by: ANESTHESIOLOGY

## 2019-10-23 PROCEDURE — 700105 HCHG RX REV CODE 258: Performed by: SURGERY

## 2019-10-23 RX ORDER — ONDANSETRON 2 MG/ML
INJECTION INTRAMUSCULAR; INTRAVENOUS PRN
Status: DISCONTINUED | OUTPATIENT
Start: 2019-10-23 | End: 2019-10-23 | Stop reason: SURG

## 2019-10-23 RX ORDER — LABETALOL HYDROCHLORIDE 5 MG/ML
5 INJECTION, SOLUTION INTRAVENOUS
Status: DISCONTINUED | OUTPATIENT
Start: 2019-10-23 | End: 2019-10-23 | Stop reason: HOSPADM

## 2019-10-23 RX ORDER — HYDROMORPHONE HYDROCHLORIDE 1 MG/ML
0.1 INJECTION, SOLUTION INTRAMUSCULAR; INTRAVENOUS; SUBCUTANEOUS
Status: DISCONTINUED | OUTPATIENT
Start: 2019-10-23 | End: 2019-10-23 | Stop reason: HOSPADM

## 2019-10-23 RX ORDER — HYDROMORPHONE HYDROCHLORIDE 1 MG/ML
0.2 INJECTION, SOLUTION INTRAMUSCULAR; INTRAVENOUS; SUBCUTANEOUS
Status: DISCONTINUED | OUTPATIENT
Start: 2019-10-23 | End: 2019-10-23 | Stop reason: HOSPADM

## 2019-10-23 RX ORDER — DIPHENHYDRAMINE HYDROCHLORIDE 50 MG/ML
INJECTION INTRAMUSCULAR; INTRAVENOUS PRN
Status: DISCONTINUED | OUTPATIENT
Start: 2019-10-23 | End: 2019-10-23 | Stop reason: SURG

## 2019-10-23 RX ORDER — EPINEPHRINE 1 MG/ML(1)
AMPUL (ML) INJECTION
Status: DISCONTINUED
Start: 2019-10-23 | End: 2019-10-23 | Stop reason: HOSPADM

## 2019-10-23 RX ORDER — NALOXONE HYDROCHLORIDE 0.4 MG/ML
INJECTION, SOLUTION INTRAMUSCULAR; INTRAVENOUS; SUBCUTANEOUS PRN
Status: DISCONTINUED | OUTPATIENT
Start: 2019-10-23 | End: 2019-10-23 | Stop reason: SURG

## 2019-10-23 RX ORDER — SODIUM CHLORIDE 9 MG/ML
INJECTION, SOLUTION INTRAVENOUS ONCE
Status: COMPLETED | OUTPATIENT
Start: 2019-10-23 | End: 2019-10-23

## 2019-10-23 RX ORDER — ONDANSETRON 2 MG/ML
4 INJECTION INTRAMUSCULAR; INTRAVENOUS
Status: DISCONTINUED | OUTPATIENT
Start: 2019-10-23 | End: 2019-10-23 | Stop reason: HOSPADM

## 2019-10-23 RX ORDER — HYDROMORPHONE HYDROCHLORIDE 2 MG/ML
INJECTION, SOLUTION INTRAMUSCULAR; INTRAVENOUS; SUBCUTANEOUS PRN
Status: DISCONTINUED | OUTPATIENT
Start: 2019-10-23 | End: 2019-10-23 | Stop reason: SURG

## 2019-10-23 RX ORDER — BUPIVACAINE HYDROCHLORIDE 5 MG/ML
INJECTION, SOLUTION EPIDURAL; INTRACAUDAL
Status: DISCONTINUED
Start: 2019-10-23 | End: 2019-10-23 | Stop reason: HOSPADM

## 2019-10-23 RX ORDER — HYDRALAZINE HYDROCHLORIDE 20 MG/ML
5 INJECTION INTRAMUSCULAR; INTRAVENOUS
Status: DISCONTINUED | OUTPATIENT
Start: 2019-10-23 | End: 2019-10-23 | Stop reason: HOSPADM

## 2019-10-23 RX ORDER — METOPROLOL TARTRATE 1 MG/ML
1 INJECTION, SOLUTION INTRAVENOUS
Status: DISCONTINUED | OUTPATIENT
Start: 2019-10-23 | End: 2019-10-23 | Stop reason: HOSPADM

## 2019-10-23 RX ORDER — KETAMINE HYDROCHLORIDE 50 MG/ML
INJECTION, SOLUTION INTRAMUSCULAR; INTRAVENOUS PRN
Status: DISCONTINUED | OUTPATIENT
Start: 2019-10-23 | End: 2019-10-23 | Stop reason: SURG

## 2019-10-23 RX ORDER — SODIUM CHLORIDE 9 MG/ML
INJECTION, SOLUTION INTRAVENOUS CONTINUOUS
Status: DISCONTINUED | OUTPATIENT
Start: 2019-10-23 | End: 2019-10-23 | Stop reason: HOSPADM

## 2019-10-23 RX ORDER — HYDROMORPHONE HYDROCHLORIDE 1 MG/ML
0.4 INJECTION, SOLUTION INTRAMUSCULAR; INTRAVENOUS; SUBCUTANEOUS
Status: DISCONTINUED | OUTPATIENT
Start: 2019-10-23 | End: 2019-10-23 | Stop reason: HOSPADM

## 2019-10-23 RX ORDER — KETOROLAC TROMETHAMINE 30 MG/ML
30 INJECTION, SOLUTION INTRAMUSCULAR; INTRAVENOUS EVERY 6 HOURS PRN
Status: DISCONTINUED | OUTPATIENT
Start: 2019-10-23 | End: 2019-10-23 | Stop reason: HOSPADM

## 2019-10-23 RX ORDER — CEFAZOLIN SODIUM 1 G/3ML
INJECTION, POWDER, FOR SOLUTION INTRAMUSCULAR; INTRAVENOUS PRN
Status: DISCONTINUED | OUTPATIENT
Start: 2019-10-23 | End: 2019-10-23 | Stop reason: SURG

## 2019-10-23 RX ORDER — SODIUM CHLORIDE 9 MG/ML
INJECTION, SOLUTION INTRAVENOUS
Status: DISCONTINUED | OUTPATIENT
Start: 2019-10-23 | End: 2019-10-23 | Stop reason: SURG

## 2019-10-23 RX ORDER — BUPIVACAINE HYDROCHLORIDE AND EPINEPHRINE 5; 5 MG/ML; UG/ML
INJECTION, SOLUTION EPIDURAL; INTRACAUDAL; PERINEURAL
Status: DISCONTINUED | OUTPATIENT
Start: 2019-10-23 | End: 2019-10-23 | Stop reason: HOSPADM

## 2019-10-23 RX ADMIN — LIDOCAINE HYDROCHLORIDE 50 MG: 20 INJECTION, SOLUTION INFILTRATION; PERINEURAL at 09:54

## 2019-10-23 RX ADMIN — SODIUM CHLORIDE: 9 INJECTION, SOLUTION INTRAVENOUS at 09:52

## 2019-10-23 RX ADMIN — MIDAZOLAM HYDROCHLORIDE 2 MG: 1 INJECTION, SOLUTION INTRAMUSCULAR; INTRAVENOUS at 09:54

## 2019-10-23 RX ADMIN — ONDANSETRON 4 MG: 2 INJECTION INTRAMUSCULAR; INTRAVENOUS at 09:54

## 2019-10-23 RX ADMIN — NALOXONE HYDROCHLORIDE 40 MCG: 0.4 INJECTION, SOLUTION INTRAMUSCULAR; INTRAVENOUS; SUBCUTANEOUS at 10:33

## 2019-10-23 RX ADMIN — SODIUM CHLORIDE: 900 INJECTION INTRAVENOUS at 09:54

## 2019-10-23 RX ADMIN — CEFAZOLIN 2 G: 330 INJECTION, POWDER, FOR SOLUTION INTRAMUSCULAR; INTRAVENOUS at 09:54

## 2019-10-23 RX ADMIN — KETAMINE HYDROCHLORIDE 50 MG: 50 INJECTION, SOLUTION INTRAMUSCULAR; INTRAVENOUS at 10:18

## 2019-10-23 RX ADMIN — HYDROMORPHONE HYDROCHLORIDE 0.5 MG: 2 INJECTION, SOLUTION INTRAMUSCULAR; INTRAVENOUS; SUBCUTANEOUS at 09:54

## 2019-10-23 RX ADMIN — DIPHENHYDRAMINE HYDROCHLORIDE 12.5 MG: 50 INJECTION, SOLUTION INTRAMUSCULAR; INTRAVENOUS at 10:16

## 2019-10-23 RX ADMIN — PROPOFOL 200 MG: 10 INJECTION, EMULSION INTRAVENOUS at 09:54

## 2019-10-23 ASSESSMENT — PAIN SCALES - GENERAL: PAIN_LEVEL: 0

## 2019-10-23 NOTE — OR NURSING
1045 Pt arrived from OR, rec'd report from Dr. Garcia. VSS. Oral airway in place, no s/s of resp distress. Gauze and tegaderm to R abimael, AGUSTIN. Pt sleepy.     1111 Pt's oral airway d/c'd, pt tolerated well. Pt back to sleep, denies pain.     1215 Family brought to bedside.     1230 Pt requesting to get dressed. Pt sitting on side of bed, c/o of nausea, denies medication. Quese Ease given and cool towel for comfort.     1245 Pt voided without difficulty. Ambulated to bathroom.     1300 Pt expressed readiness for d/c. Discussed d/c instructions with pt and pt's spouse. Answered all questions Pt verbalized understanding. Pt refused wheelchair. Ambulated out with spouse.

## 2019-10-23 NOTE — ANESTHESIA PREPROCEDURE EVALUATION
Relevant Problems   NEURO   (+) History of colonic polyps       Physical Exam    Airway   Mallampati: II  TM distance: >3 FB  Neck ROM: full       Cardiovascular - normal exam  Rhythm: regular  Rate: normal  (-) murmur     Dental - normal exam         Pulmonary - normal exam  Breath sounds clear to auscultation     Abdominal    Neurological - normal exam                 Anesthesia Plan    ASA 2       Plan - general       Airway plan will be LMA  (Patient requests ketamine infusion, benadryl, no LR, and no ketamine infusion while awake)      Induction: intravenous    Postoperative Plan: Postoperative administration of opioids is intended.    Pertinent diagnostic labs and testing reviewed    Informed Consent:    Anesthetic plan and risks discussed with patient.    Use of blood products discussed with: patient whom consented to blood products.

## 2019-10-23 NOTE — ANESTHESIA POSTPROCEDURE EVALUATION
Patient: Linn Mcconnell    Procedure Summary     Date:  10/23/19 Room / Location:  [facility] ROOM 25 / SURGERY SAME DAY [facility]    Anesthesia Start:  0954 Anesthesia Stop:      Procedure:  LYMPHADENECTOMY- INGUINAL (Right Groin) Diagnosis:  (LYMPHADENOPATHY)    Surgeon:  Josesito Montana M.D. Responsible Provider:  Jose Camargo M.D.    Anesthesia Type:  general ASA Status:  2          Final Anesthesia Type: general  Last vitals  BP   NIBP: 106/68    Temp   36.5 °C (97.7 °F)    Pulse   Pulse: 62   Resp   16    SpO2   100 %      Anesthesia Post Evaluation    Patient location during evaluation: PACU  Patient participation: complete - patient participated  Level of consciousness: awake and alert  Pain score: 0    Airway patency: patent  Anesthetic complications: no  Cardiovascular status: hemodynamically stable  Respiratory status: acceptable  Hydration status: euvolemic    PONV: none           Nurse Pain Score: 0 (NPRS)

## 2019-10-23 NOTE — OR SURGEON
Immediate Post OP Note    PreOp Diagnosis: 1.  Lymphadenopathy  2.  Abnormal weight loss    PostOp Diagnosis: 1.  Lymphadenopathy  2.  Abnormal weight loss      Procedure(s):  LYMPHADENECTOMY- INGUINAL, RIGHT - Wound Class: Clean    Surgeon(s):  Josesito Montana M.D.    Anesthesiologist/Type of Anesthesia:  Anesthesiologist: Jose Camargo M.D./General    Surgical Staff:  Circulator: Jeanine Danielson R.N.  Scrub Person: Perla Gil    Specimens removed if any:  ID Type Source Tests Collected by Time Destination   A : right inguinal lymphnodes; rule out lymphoma or other neoplastic disease  Tissue Lymph Node PATHOLOGY SPECIMEN Josesito Montana M.D. 10/23/2019 10:23 AM        Estimated Blood Loss: 1 ml    Findings: Two prominent, 1 cm in greatest diameter, lymph nodes in right inguinal region    Complications: None        10/23/2019 10:41 AM Josesito Montana M.D.

## 2019-10-23 NOTE — ANESTHESIA PROCEDURE NOTES
Airway  Date/Time: 10/23/2019 10:38 AM  Performed by: Jose Camargo M.D.  Authorized by: Jose Camargo M.D.     Location:  OR  Urgency:  Elective  Indications for Airway Management:  Anesthesia  Spontaneous Ventilation: absent    Sedation Level:  Deep  Preoxygenated: Yes    Final Airway Type:  Supraglottic airway  Final Supraglottic Airway:  Standard LMA  SGA Size:  4  Number of Attempts at Approach:  1

## 2019-10-23 NOTE — OP REPORT
DATE OF SERVICE:  10/23/2019    PREOPERATIVE DIAGNOSES:  1.  Lymphadenopathy.  2.  Abnormal weight loss.    POSTOPERATIVE DIAGNOSES:  1.  Lymphadenopathy.  2.  Abnormal weight loss.    INDICATION FOR SURGERY:  This is a 44-year-old female referred to my office to   consider a lymph node biopsy in the ____ inguinal regions due to abnormal   weight loss, reported lymphadenopathy and a workup so far unsuccessful in   determining the etiology.  She was found to be an adequate surgical candidate   and after a CT scan of the abdomen and pelvis did not show any significantly   enlarged lymph node, she was brought to the operating room today for a planned   lymphadenectomy of 1-2 lymph nodes in the right inguinal region.    PROCEDURE:  Lymphadenectomy of 2 separate right inguinal lymph nodes.    SURGEON:  Josesito Montana MD    ASSISTANT:  None.    ANESTHESIOLOGIST:  Jose Camargo MD    ANESTHESIA:  General anesthesia with LMA.    FINDINGS:  Two prominent, 1 cm in greatest diameter, lymph nodes in the right   inguinal region, both removed without complication.    PROCEDURE NARRATIVE:  Signed informed consent was on the chart at the time of   the procedure.  The patient was brought to the operating room and placed in   the supine position.  General anesthesia was induced.  Skin over the right   inguinal region was prepped and draped in a sterile fashion.  Patient was   administered 2 grams of Ancef IV preoperatively.  A timeout was performed   after first infusing the skin and soft tissue with local anesthetic, which in   this case is 0.5% Marcaine with epinephrine.  The skin over the previously   marked prominent lymph nodes was opened in an oblique direction along the skin   lines for approximately 2 cm.  The underlying soft tissue was dissected   through using careful sharp dissection until a prominent lymph node was   visualized.  This was lifted and the blood supply to the lymph node was taken   down using  careful Bovie cautery.  The lymph node was dissected free of the   surrounding soft tissue and set aside, a second prominent lymph node was noted   directly into the first and this also was lifted and the blood supply to the   lymph node was taken down using careful Bovie cautery and these 2 lymph nodes   were placed in saline and sent to pathology for fresh examination to rule out   neoplastic disease.  The wound was irrigated and dried, 1 small vessel was   coagulated with Bovie cautery.  An additional 3 mL of 0.5% Marcaine with   epinephrine was infused in the tissues around the excision site.  The deep   dermis was closed with a series of 3-0 Vicryl interrupted sutures and the skin   was closed with a running 4-0 Vicryl subcuticular stitch.  The wound was   dressed with 2x2 gauze pad followed by clear Tegaderm.    FLUIDS:  200 mL crystalloid.    ESTIMATED BLOOD LOSS:  1  mL.    DRAINS:  None.    SPECIMENS:  Right inguinal lymph nodes to pathology.    COMPLICATIONS:  None.    DISPOSITION:  Patient was in stable condition to postanesthesia care unit.       ____________________________________     MD ALKA Ramírez / CHEYANNE    DD:  10/23/2019 10:50:04  DT:  10/23/2019 11:28:15    D#:  [id number]  Job#:  [id number]

## 2019-10-23 NOTE — ANESTHESIA TIME REPORT
Anesthesia Start and Stop Event Times     Date Time Event    10/23/2019 0937 Ready for Procedure     0954 Anesthesia Start     1046 Anesthesia Stop        Responsible Staff  10/23/19    Name Role Begin End    Jose Camargo M.D. Anesth 0954 1046        Preop Diagnosis (Free Text):  Pre-op Diagnosis     LYMPHADENOPATHY         Preop Diagnosis (Codes):    Post op Diagnosis  Lymphadenopathy, inguinal      Premium Reason  Non-Premium    Comments:

## 2019-10-23 NOTE — DISCHARGE INSTRUCTIONS
ACTIVITY: Rest and take it easy for the first 24 hours.  A responsible adult is recommended to remain with you during that time.  It is normal to feel sleepy.  We encourage you to not do anything that requires balance, judgment or coordination.    MILD FLU-LIKE SYMPTOMS ARE NORMAL. YOU MAY EXPERIENCE GENERALIZED MUSCLE ACHES, THROAT IRRITATION, HEADACHE AND/OR SOME NAUSEA.    FOR 24 HOURS DO NOT:  Drive, operate machinery or run household appliances.  Drink beer or alcoholic beverages.   Make important decisions or sign legal documents.    SPECIAL INSTRUCTIONS: *No lifting restrictions. Remove dressing 10/26**    DIET: To avoid nausea, slowly advance diet as tolerated, avoiding spicy or greasy foods for the first day.  Add more substantial food to your diet according to your physician's instructions.  Babies can be fed formula or breast milk as soon as they are hungry.  INCREASE FLUIDS AND FIBER TO AVOID CONSTIPATION.    SURGICAL DRESSING/BATHING: *Remove dressing on 10/26. May shower then. No baths, hot tubs, or swimming pools**    FOLLOW-UP APPOINTMENT:  A follow-up appointment should be arranged with your doctor in *1-14 days with **; call to schedule.    You should CALL YOUR PHYSICIAN if you develop:  Fever greater than 101 degrees F.  Pain not relieved by medication, or persistent nausea or vomiting.  Excessive bleeding (blood soaking through dressing) or unexpected drainage from the wound.  Extreme redness or swelling around the incision site, drainage of pus or foul smelling drainage.  Inability to urinate or empty your bladder within 8 hours.  Problems with breathing or chest pain.    You should call 911 if you develop problems with breathing or chest pain.  If you are unable to contact your doctor or surgical center, you should go to the nearest emergency room or urgent care center.  Physician's telephone #: * [phone]**    If any questions arise, call your doctor.  If your  doctor is not available, please feel free to call the Surgical Center at [phone].  The Center is open Monday through Friday from 7AM to 7PM.  You can also call the HEALTH HOTLINE open 24 hours/day, 7 days/week and speak to a nurse at [phone], or toll free at [phone].    A registered nurse may call you a few days after your surgery to see how you are doing after your procedure.    MEDICATIONS: Resume taking daily medication.  Take prescribed pain medication with food.  If no medication is prescribed, you may take non-aspirin pain medication if needed.  PAIN MEDICATION CAN BE VERY CONSTIPATING.  Take a stool softener or laxative such as senokot, pericolace, or milk of magnesia if needed.    Prescription given for *none**.  Last pain medication given at **take first dose when ready*.    If your physician has prescribed pain medication that includes Acetaminophen (Tylenol), do not take additional Acetaminophen (Tylenol) while taking the prescribed medication.    Depression / Suicide Risk    As you are discharged from this [facility] facility, it is important to learn how to keep safe from harming yourself.    Recognize the warning signs:  · Abrupt changes in personality, positive or negative- including increase in energy   · Giving away possessions  · Change in eating patterns- significant weight changes-  positive or negative  · Change in sleeping patterns- unable to sleep or sleeping all the time   · Unwillingness or inability to communicate  · Depression  · Unusual sadness, discouragement and loneliness  · Talk of wanting to die  · Neglect of personal appearance   · Rebelliousness- reckless behavior  · Withdrawal from people/activities they love  · Confusion- inability to concentrate     If you or a loved one observes any of these behaviors or has concerns about self-harm, here's what you can do:  · Talk about it- your feelings and reasons for harming yourself  · Remove any means that you might  use to hurt yourself (examples: pills, rope, extension cords, firearm)  · Get professional help from the community (Mental Health, Substance Abuse, psychological counseling)  · Do not be alone:Call your Safe Contact- someone whom you trust who will be there for you.  · Call your local CRISIS HOTLINE 487-9613 or 748-713-6502  · Call your local Children's Mobile Crisis Response Team [place] (888) 443-9930 or www.[employer]  · Call the toll free National Suicide Prevention Hotlines   · National Suicide Prevention Lifeline 225-879-RRRA (5156)  · National Hope Line Network 800-SUICIDE (616-0791)

## 2019-11-21 ENCOUNTER — APPOINTMENT (OUTPATIENT)
Dept: CARDIOLOGY | Facility: MEDICAL CENTER | Age: 44
End: 2019-11-21
Attending: INTERNAL MEDICINE
Payer: COMMERCIAL

## 2019-12-12 ENCOUNTER — APPOINTMENT (OUTPATIENT)
Dept: CARDIOLOGY | Facility: MEDICAL CENTER | Age: 44
End: 2019-12-12
Attending: INTERNAL MEDICINE
Payer: COMMERCIAL

## 2019-12-30 ENCOUNTER — HOSPITAL ENCOUNTER (OUTPATIENT)
Dept: CARDIOLOGY | Facility: MEDICAL CENTER | Age: 44
End: 2019-12-30
Attending: INTERNAL MEDICINE
Payer: COMMERCIAL

## 2019-12-30 DIAGNOSIS — R07.9 CHEST PAIN, UNSPECIFIED TYPE: ICD-10-CM

## 2019-12-30 DIAGNOSIS — I45.9 SKIPPED BEATS: ICD-10-CM

## 2019-12-30 LAB
LV EJECT FRACT  99904: 60
LV EJECT FRACT MOD 2C 99903: 67.82
LV EJECT FRACT MOD 4C 99902: 57.01
LV EJECT FRACT MOD BP 99901: 63.26

## 2019-12-30 PROCEDURE — 93306 TTE W/DOPPLER COMPLETE: CPT

## 2019-12-30 PROCEDURE — 93306 TTE W/DOPPLER COMPLETE: CPT | Mod: 26 | Performed by: INTERNAL MEDICINE

## 2020-01-08 NOTE — PROGRESS NOTES
No chief complaint on file.      Subjective:   Linn Mcconnell is a 44 y.o. female who presents today for follow up regarding his left sided chest pain.    Patient was seen for initial consultation by Dr. Andrei Wren on 10/7/19. A chest x-ray and echocardiogram was ordered at that time.    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 6, complex pain syndrome, recent inguinal lymph node biopsy due to weight loss and lymphadenopathy,     Today patient states that     Past Medical History:   Diagnosis Date   • Acid reflux    • Anesthesia     PONV   • Bowel habit changes     constipation   • Complex regional pain syndrome 08-    left foot, 3/10   • Gynecological disorder     pelvic pain   • Osteopenia    • Painful menstruation     history of   • Pelvic and perineal pain    • Personal history of colonic polyps 4/25/2011   • Psychiatric problem     depression   • Vasovagal near syncope 4/25/2011     Past Surgical History:   Procedure Laterality Date   • NODE DISSECTION Right 10/23/2019    Procedure: LYMPHADENECTOMY- INGUINAL;  Surgeon: Josesito Montana M.D.;  Location: SURGERY SAME DAY [facility];  Service: General   • RECTAL EXPLORATION  8/29/2018    Procedure: RECTAL EXPLORATION-  EUA;  Surgeon: Maldonado Schumacher M.D.;  Location: [facility];  Service: General   • SIGMOIDOSCOPY FLEX  8/29/2018    Procedure: SIGMOIDOSCOPY FLEX-  WITH BIOPSIES;  Surgeon: Maldonado Schumacher M.D.;  Location: [facility];  Service: General   • CYSTOSCOPY  6/6/2018    Procedure: CYSTOSCOPY;  Surgeon: Patricia Senior M.D.;  Location: [facility];  Service: Gynecology   • HYSTERECTOMY ROBOTIC  6/6/2018    Procedure: HYSTERECTOMY ROBOTIC;  Surgeon: Patricia Senior M.D.;  Location: [facility];  Service: Gynecology   • SALPINGECTOMY Left 6/6/2018    Procedure: SALPINGECTOMY;  Surgeon: Patricia Senior M.D.;  Location: [facility];  Service:  Gynecology   • OOPHORECTOMY Left 6/6/2018    Procedure: OOPHORECTOMY;  Surgeon: Patricia Senior M.D.;  Location: SURGERY [facility];  Service: Gynecology   • PELVISCOPY  8/24/2017    Procedure: PELVISCOPY- DIAGNOSTIC WITH FULGURATION OF ENDOMETRIOSIS;  Surgeon: Jolie Mclean M.D.;  Location: SURGERY SAME DAY [facility];  Service:    • SALPINGECTOMY Right 8/24/2017    Procedure: SALPINGECTOMY-OOPHERECTOMY;  Surgeon: Jolie Mclean M.D.;  Location: SURGERY SAME DAY [facility];  Service:    • APPENDECTOMY LAPAROSCOPIC  8/18/2016    Procedure: APPENDECTOMY LAPAROSCOPIC;  Surgeon: Alexander Michele M.D.;  Location: [facility];  Service:    • VULVECTOMY PARTIAL  6/21/2016    Procedure: PARTIAL SIMPLE VULVECTOMY ;  Surgeon: Jolie Mclean M.D.;  Location: SURGERY SAME DAY [facility];  Service:    • PB INJ LUMBAR/SACRAL,W/WO CNTRST  3/9/2016    Procedure: INJ EPI NON NEUROLYTIC L/S L5/ S1;  Surgeon: Alfonso Garcia M.D.;  Location: [facility];  Service: Pain Management   • PB FLUORSCOPIC GUIDANCE SPINAL INJECTION  3/9/2016    Procedure: FLUOROGUIDE FOR SPINAL INJ;  Surgeon: Alfonso Garcia M.D.;  Location: [facility];  Service: Pain Management   • IA INJ FOR SACROILIAC JT ANESTH Right 11/25/2015    Procedure: INJ, SACROILIAC, ANES/STEROID;  Surgeon: Alfonso Garcia M.D.;  Location: [facility];  Service: Pain Management   • OTHER NEUROLOGICAL SURG      spinal cord stimulator   • OTHER ORTHOPEDIC SURGERY Left     hip surgery   • TUBAL COAGULATION LAPAROSCOPIC BILATERAL  5/8/2014    Performed by Jolie Mclean M.D. at SURGERY SAME DAY [facility]   • HYSTEROSCOPY NOVASURE-2  5/8/2014    Performed by Jolie Mclean M.D. at SURGERY SAME DAY [facility]   • OTHER  2014    CRPS   • PRIMARY C SECTION  2/24/08   • PRIMARY C SECTION  6/25/04    twins     Family History   Problem Relation Age of Onset   • Hyperlipidemia Mother    •  "Cancer Father         lung cancer , non smoker   • Lung Disease Father    • Cancer Maternal Grandmother         thyroid, skin    • Hyperlipidemia Maternal Grandmother    • Stroke Maternal Grandfather    • Diabetes Paternal Grandmother    • Cancer Paternal Grandmother         breast     Social History     Socioeconomic History   • Marital status:      Spouse name: Not on file   • Number of children: Not on file   • Years of education: Not on file   • Highest education level: Not on file   Occupational History   • Not on file   Social Needs   • Financial resource strain: Not on file   • Food insecurity:     Worry: Not on file     Inability: Not on file   • Transportation needs:     Medical: Not on file     Non-medical: Not on file   Tobacco Use   • Smoking status: Never Smoker   • Smokeless tobacco: Never Used   Substance and Sexual Activity   • Alcohol use: No     Alcohol/week: 0.0 oz   • Drug use: No   • Sexual activity: Yes     Partners: Male     Comment:     Lifestyle   • Physical activity:     Days per week: Not on file     Minutes per session: Not on file   • Stress: Not on file   Relationships   • Social connections:     Talks on phone: Not on file     Gets together: Not on file     Attends [employer] service: Not on file     Active member of club or organization: Not on file     Attends meetings of clubs or organizations: Not on file     Relationship status: Not on file   • Intimate partner violence:     Fear of current or ex partner: Not on file     Emotionally abused: Not on file     Physically abused: Not on file     Forced sexual activity: Not on file   Other Topics Concern   • Not on file   Social History Narrative   • Not on file     Allergies   Allergen Reactions   • Fentanyl Vomiting     vomiting   • Flagyl [Metronidazole] Vomiting     \"non-stop vomiting\"   • Gluten Meal Nausea     nausea   • Lactated Ringers      Family history of mitochondrial disease   • Norco [Apap-Fd&C Yellow #10 [place] " [place]-Hydrocodone] Vomiting     vomiting   • Percocet [Apap-Fd&C Red #40 [place]-Oxycodone] Vomiting     vomiting   • Phenergan [Promethazine] Nausea     nausea   • Scopolamine Unspecified     Blurry eyes/dizzy     • Vicodin [Hydrocodone-Acetaminophen] Vomiting     vomiting     Outpatient Encounter Medications as of 1/9/2020   Medication Sig Dispense Refill   • Estradiol-Estriol-Progesterone (BIEST/PROGESTERONE TD) Place 0.05 mL under tongue 2 Times a Day.     • buPROPion SR (WELLBUTRIN-SR) 150 MG TABLET SR 12 HR sustained-release tablet Take 150 mg by mouth 2 times a day.     • topiramate (TOPAMAX) 25 MG Tab Take 25 mg by mouth every day.     • Ascorbic Acid (VITAMIN C) 1000 MG Tab Take 1,000 mg by mouth 2 Times a Day.       No facility-administered encounter medications on file as of 1/9/2020.      ROS     Objective:   LMP 06/06/2018 (Approximate)     Physical Exam     Echocardiogram 12/30/19:  CONCLUSIONS  No prior.  Left ventricular ejection fraction is visually estimated to be 60%.  Mildly dilated left atrium.  No significant valve/doppler abnormality.      FINDINGS  Left Ventricle  The left ventricle was normal in size and function.  Normal left ventricular wall thickness. Normal left ventricular systolic function. Left ventricular ejection fraction is visually estimated to be 60%. Normal regional wall motion. Normal diastolic function.     Right Ventricle  The right ventricle was normal in size and function.     Right Atrium  The right atrium is normal in size.  Normal inferior vena cava size and inspiratory collapse.     Left Atrium  Mildly dilated left atrium. Left atrial volume index is 39  mL/sq m.     Mitral Valve  Structurally normal mitral valve without significant stenosis or regurgitation.     Aortic Valve  Structurally normal aortic valve without significant stenosis or regurgitation.     Tricuspid Valve  Structurally normal tricuspid valve without significant stenosis. Trace tricuspid regurgitation.  Right atrial pressure is estimated to be 3 mmHg. Unable to estimate pulmonary artery pressure due to an inadequate tricuspid regurgitant jet.     Pulmonic Valve  Structurally normal pulmonic valve without significant stenosis or regurgitation.     Pericardium  Normal pericardium without effusion.     Aorta  The aortic root is normal.  Ascending aorta not well visualized. Arch normal size.    Chest X-Ray 10/10/19:  IMPRESSION:  No acute cardiac or pulmonary abnormality is noted.     CT Chest 10/10/19:  IMPRESSION:  1.  Negative CT scan of the chest with contrast.  2.  Hepatomegaly        Assessment:   No diagnosis found.    Medical Decision Making:  Today's Assessment / Status / Plan:     Chest Pain:

## 2020-01-09 ENCOUNTER — APPOINTMENT (OUTPATIENT)
Dept: CARDIOLOGY | Facility: MEDICAL CENTER | Age: 45
End: 2020-01-09
Payer: COMMERCIAL

## 2020-01-10 ENCOUNTER — OFFICE VISIT (OUTPATIENT)
Dept: CARDIOLOGY | Facility: MEDICAL CENTER | Age: 45
End: 2020-01-10
Payer: COMMERCIAL

## 2020-01-10 VITALS
SYSTOLIC BLOOD PRESSURE: 118 MMHG | HEART RATE: 68 BPM | WEIGHT: 111.33 LBS | DIASTOLIC BLOOD PRESSURE: 64 MMHG | BODY MASS INDEX: 19.01 KG/M2 | OXYGEN SATURATION: 98 % | HEIGHT: 64 IN

## 2020-01-10 DIAGNOSIS — I45.9 SKIPPED BEATS: ICD-10-CM

## 2020-01-10 DIAGNOSIS — R07.89 ATYPICAL CHEST PAIN: ICD-10-CM

## 2020-01-10 DIAGNOSIS — R00.2 PALPITATIONS: ICD-10-CM

## 2020-01-10 PROBLEM — R07.9 CHEST PAIN: Status: RESOLVED | Noted: 2019-10-07 | Resolved: 2020-01-10

## 2020-01-10 LAB — EKG IMPRESSION: NORMAL

## 2020-01-10 PROCEDURE — 93000 ELECTROCARDIOGRAM COMPLETE: CPT | Performed by: INTERNAL MEDICINE

## 2020-01-10 PROCEDURE — 99214 OFFICE O/P EST MOD 30 MIN: CPT | Performed by: NURSE PRACTITIONER

## 2020-01-10 ASSESSMENT — ENCOUNTER SYMPTOMS
MYALGIAS: 0
PND: 0
FEVER: 0
CLAUDICATION: 0
PALPITATIONS: 1
ORTHOPNEA: 0
ABDOMINAL PAIN: 0
SHORTNESS OF BREATH: 0
DIZZINESS: 0
COUGH: 0

## 2020-01-10 NOTE — PROGRESS NOTES
Chief Complaint   Patient presents with   • Palpitations     Subjective:   Linn Mcconnell is a 44 y.o. female who presents today for urgent follow up for increased palpitations.    She is a prior patient of Dr. Wren in our office, she has yet to establish with another MD in our group, we will set her up today.    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    Atypical chest pains have been worked up with echocardiogram and CXR alongside multiple CTs of her chest with no findings. Her chest pains are not palpable or positional.    She also has had unexplained weight loss that her PCP has no diagnosis for.    She used to be a marathon runner and swimmer.    She continues to have intermittent and bothersome palpitations that make her cough. Nothing persistent. No other symptoms alongside this.    Past Medical History:   Diagnosis Date   • Acid reflux    • Anesthesia     PONV   • Bowel habit changes     constipation   • Complex regional pain syndrome 08-    left foot, 3/10   • Gynecological disorder     pelvic pain   • Osteopenia    • Painful menstruation     history of   • Pelvic and perineal pain    • Personal history of colonic polyps 4/25/2011   • Psychiatric problem     depression   • Vasovagal near syncope 4/25/2011     Past Surgical History:   Procedure Laterality Date   • NODE DISSECTION Right 10/23/2019    Procedure: LYMPHADENECTOMY- INGUINAL;  Surgeon: Josesito Montana M.D.;  Location: SURGERY SAME DAY [facility];  Service: General   • RECTAL EXPLORATION  8/29/2018    Procedure: RECTAL EXPLORATION-  EUA;  Surgeon: Maldonado Schumacher M.D.;  Location: [facility];  Service: General   • SIGMOIDOSCOPY FLEX  8/29/2018    Procedure: SIGMOIDOSCOPY FLEX-  WITH BIOPSIES;  Surgeon: Maldonado Schumacher M.D.;  Location: [facility];  Service: General   • CYSTOSCOPY  6/6/2018    Procedure: CYSTOSCOPY;  Surgeon: Patricia Senior M.D.;  Location: [facility];  Service: Gynecology   • HYSTERECTOMY ROBOTIC  6/6/2018    Procedure: HYSTERECTOMY ROBOTIC;  Surgeon: Patricia Senior M.D.;  Location: SURGERY [facility];  Service: Gynecology   • SALPINGECTOMY Left 6/6/2018    Procedure: SALPINGECTOMY;  Surgeon: Patricia Senior M.D.;  Location: SURGERY [facility];  Service: Gynecology   • OOPHORECTOMY Left 6/6/2018    Procedure: OOPHORECTOMY;  Surgeon: Patricia Senior M.D.;  Location: SURGERY [facility];  Service: Gynecology   • PELVISCOPY  8/24/2017    Procedure: PELVISCOPY- DIAGNOSTIC WITH FULGURATION OF ENDOMETRIOSIS;  Surgeon: Jolie Mclean M.D.;  Location: SURGERY SAME DAY [facility];  Service:    • SALPINGECTOMY Right 8/24/2017    Procedure: SALPINGECTOMY-OOPHERECTOMY;  Surgeon: Jolie Mclean M.D.;  Location: SURGERY SAME DAY [facility];  Service:    • APPENDECTOMY LAPAROSCOPIC  8/18/2016    Procedure: APPENDECTOMY LAPAROSCOPIC;  Surgeon: Alexander Michele M.D.;  Location: [facility];  Service:    • VULVECTOMY PARTIAL  6/21/2016    Procedure: PARTIAL SIMPLE VULVECTOMY ;  Surgeon: Jolie Mclean M.D.;  Location: SURGERY SAME DAY [facility];  Service:    • PB INJ LUMBAR/SACRAL,W/WO CNTRST  3/9/2016    Procedure: INJ EPI NON NEUROLYTIC L/S L5/ S1;  Surgeon: Alfonso Garcia M.D.;  Location: [facility];  Service: Pain Management   • PB FLUORSCOPIC GUIDANCE SPINAL INJECTION  3/9/2016    Procedure: FLUOROGUIDE FOR SPINAL INJ;  Surgeon: Alfonso Garcia M.D.;  Location: [facility];  Service: Pain Management   • IN INJ FOR SACROILIAC JT ANESTH Right 11/25/2015    Procedure: INJ, SACROILIAC, ANES/STEROID;  Surgeon: Alfonso Garcia M.D.;  Location: [facility];  Service: Pain Management   • OTHER NEUROLOGICAL SURG      spinal cord stimulator   • OTHER ORTHOPEDIC SURGERY Left     hip surgery   • TUBAL COAGULATION LAPAROSCOPIC BILATERAL  5/8/2014    Performed by Jolie RUTH  LUKAS Mclean at SURGERY SAME DAY [facility]   • HYSTEROSCOPY NOVASURE-2  5/8/2014    Performed by Jolie Mclean M.D. at SURGERY SAME DAY [facility]   • OTHER  2014    [employer]PS   • PRIMARY C SECTION  2/24/08   • PRIMARY C SECTION  6/25/04    twins     Family History   Problem Relation Age of Onset   • Hyperlipidemia Mother    • Cancer Father         lung cancer , non smoker   • Lung Disease Father    • Cancer Maternal Grandmother         thyroid, skin    • Hyperlipidemia Maternal Grandmother    • Stroke Maternal Grandfather    • Diabetes Paternal Grandmother    • Cancer Paternal Grandmother         breast     Social History     Socioeconomic History   • Marital status:      Spouse name: Not on file   • Number of children: Not on file   • Years of education: Not on file   • Highest education level: Not on file   Occupational History   • Not on file   Social Needs   • Financial resource strain: Not on file   • Food insecurity:     Worry: Not on file     Inability: Not on file   • Transportation needs:     Medical: Not on file     Non-medical: Not on file   Tobacco Use   • Smoking status: Never Smoker   • Smokeless tobacco: Never Used   Substance and Sexual Activity   • Alcohol use: No     Alcohol/week: 0.0 oz   • Drug use: No   • Sexual activity: Yes     Partners: Male     Comment:     Lifestyle   • Physical activity:     Days per week: Not on file     Minutes per session: Not on file   • Stress: Not on file   Relationships   • Social connections:     Talks on phone: Not on file     Gets together: Not on file     Attends Church service: Not on file     Active member of club or organization: Not on file     Attends meetings of clubs or organizations: Not on file     Relationship status: Not on file   • Intimate partner violence:     Fear of current or ex partner: Not on file     Emotionally abused: Not on file     Physically abused: Not on file     Forced sexual activity: Not on file   Other Topics  "Concern   • Not on file   Social History Narrative   • Not on file     Allergies   Allergen Reactions   • Fentanyl Vomiting     vomiting   • Flagyl [Metronidazole] Vomiting     \"non-stop vomiting\"   • Gluten Meal Nausea     nausea   • Lactated Ringers      Family history of mitochondrial disease   • Norco [Apap-Fd&C Yellow #10 [place]-Hydrocodone] Vomiting     vomiting   • Percocet [Apap-Fd&C Red #40 [place]-Oxycodone] Vomiting     vomiting   • Phenergan [Promethazine] Nausea     nausea   • Scopolamine Unspecified     Blurry eyes/dizzy     • Vicodin [Hydrocodone-Acetaminophen] Vomiting     vomiting     Outpatient Encounter Medications as of 1/10/2020   Medication Sig Dispense Refill   • Estradiol-Estriol-Progesterone (BIEST/PROGESTERONE TD) Place 0.05 mL under tongue 2 Times a Day.     • buPROPion SR (WELLBUTRIN-SR) 150 MG TABLET SR 12 HR sustained-release tablet Take 150 mg by mouth 2 times a day.     • topiramate (TOPAMAX) 25 MG Tab Take 25 mg by mouth every day.     • Ascorbic Acid (VITAMIN C) 1000 MG Tab Take 1,000 mg by mouth 2 Times a Day.       No facility-administered encounter medications on file as of 1/10/2020.      Review of Systems   Constitutional: Negative for fever and malaise/fatigue.   Respiratory: Negative for cough and shortness of breath.    Cardiovascular: Positive for palpitations. Negative for chest pain, orthopnea, claudication, leg swelling and PND.   Gastrointestinal: Negative for abdominal pain.   Musculoskeletal: Negative for myalgias.   Neurological: Negative for dizziness.        Objective:   BP (!) 0/0 (BP Location: Left arm, Patient Position: Sitting, BP Cuff Size: Adult)   Ht 1.626 m (5' 4\")   LMP 06/06/2018 (Approximate)   BMI 18.71 kg/m²     Physical Exam   Constitutional: She is oriented to person, place, and time. She appears well-developed and well-nourished.   HENT:   Head: Normocephalic and atraumatic.   Eyes: EOM are normal.   Neck: No JVD present.   Cardiovascular: " Normal rate, regular rhythm, normal heart sounds and intact distal pulses.   Pulmonary/Chest: Effort normal and breath sounds normal.   Musculoskeletal: Normal range of motion.         General: No edema.   Neurological: She is alert and oriented to person, place, and time.   Skin: Skin is warm and dry.   Nursing note and vitals reviewed.      Assessment:     1. Skipped beats  EKG   2. Palpitations  EKG     Medical Decision Making:  Today's Assessment / Status / Plan:     1. Palpitations  -EKG shows SR  -echo in December with no acute abnormalities except mild LA dilation-consider pulmonary cause?   -cont work up with PCP on abnormal weight loss  -ordered 14 day biotel for further review of arrhythmias   -CT chest with no concern for aneurysmal coronary arteries alongside normal echo with hx of Kawasaki's   -no medication changes at this time, wait for biotel results, call if symptoms worsen    FU in clinic in 2-3 months with AK with biotel results    Patient verbalizes understanding and agrees with the plan of care.     Collaborating MD: Vince SMART

## 2020-01-29 ENCOUNTER — NON-PROVIDER VISIT (OUTPATIENT)
Dept: CARDIOLOGY | Facility: MEDICAL CENTER | Age: 45
End: 2020-01-29
Payer: COMMERCIAL

## 2020-01-29 ENCOUNTER — TELEPHONE (OUTPATIENT)
Dept: CARDIOLOGY | Facility: MEDICAL CENTER | Age: 45
End: 2020-01-29

## 2020-01-29 DIAGNOSIS — I49.3 PVC (PREMATURE VENTRICULAR CONTRACTION): ICD-10-CM

## 2020-01-29 DIAGNOSIS — R00.2 PALPITATIONS: ICD-10-CM

## 2020-01-29 PROCEDURE — 93268 ECG RECORD/REVIEW: CPT | Performed by: INTERNAL MEDICINE

## 2020-01-29 NOTE — TELEPHONE ENCOUNTER
Patient enrolled in the 14 day Bio-Tel Heart monitoring program per Chelsi Marie, APRN.  >In office hookup with Baseline transmitted.  >Pending EOS.

## 2020-01-30 ENCOUNTER — TELEPHONE (OUTPATIENT)
Dept: CARDIOLOGY | Facility: MEDICAL CENTER | Age: 45
End: 2020-01-30

## 2020-01-30 NOTE — TELEPHONE ENCOUNTER
Received surgical clearance request from [facility] requesting cardiac clearance to proceed under general anesthesia for spinal cord stimulator trail with possible permanent placement.  Pt not scheduled until April 17th.     Hx: palpitations, echo 12/30/19 with normal LVEF, 14 day Biotel placed yesterday.    Will wait until Biotel results complete, surgery is not for several months.

## 2020-02-18 DIAGNOSIS — R00.2 PALPITATIONS: ICD-10-CM

## 2020-02-18 DIAGNOSIS — I45.9 SKIPPED BEATS: ICD-10-CM

## 2020-02-21 ENCOUNTER — TELEPHONE (OUTPATIENT)
Dept: CARDIOLOGY | Facility: MEDICAL CENTER | Age: 45
End: 2020-02-21

## 2020-02-21 NOTE — TELEPHONE ENCOUNTER
----- Message from IVY De La Cruz sent at 2/20/2020 10:24 AM PST -----  OK to clear patient for surgery. Symptoms noted with SR so not consistent with any rhythm issues. Look for non-cardiac causes. SC

## 2020-02-21 NOTE — TELEPHONE ENCOUNTER
[facility] clearance form filled out and signed by LOGAN.     Form faxed back to [phone]

## 2020-02-21 NOTE — TELEPHONE ENCOUNTER
Called patient to let her know about her results. She is still having a cough and shortness of breath at times at rest. She is happy that her PVC burden is low <1%. Recommend PCP review symptoms for non-cardiac causes. Rescheduled her apt to farther out per patient request.     Needs surgery clearance for spinal stimulator with Dr. Garcia with [facility] neurosurgery, OK to send this over today. SC

## 2020-03-06 ENCOUNTER — APPOINTMENT (RX ONLY)
Dept: URBAN - METROPOLITAN AREA CLINIC 4 | Facility: CLINIC | Age: 45
Setting detail: DERMATOLOGY
End: 2020-03-06

## 2020-03-06 DIAGNOSIS — D18.0 HEMANGIOMA: ICD-10-CM

## 2020-03-06 DIAGNOSIS — Z71.89 OTHER SPECIFIED COUNSELING: ICD-10-CM

## 2020-03-06 DIAGNOSIS — L85.3 XEROSIS CUTIS: ICD-10-CM

## 2020-03-06 DIAGNOSIS — I78.8 OTHER DISEASES OF CAPILLARIES: ICD-10-CM

## 2020-03-06 DIAGNOSIS — D22 MELANOCYTIC NEVI: ICD-10-CM

## 2020-03-06 DIAGNOSIS — L259 CONTACT DERMATITIS AND OTHER ECZEMA, UNSPECIFIED CAUSE: ICD-10-CM

## 2020-03-06 DIAGNOSIS — L73.8 OTHER SPECIFIED FOLLICULAR DISORDERS: ICD-10-CM

## 2020-03-06 DIAGNOSIS — L81.4 OTHER MELANIN HYPERPIGMENTATION: ICD-10-CM

## 2020-03-06 PROBLEM — L30.8 OTHER SPECIFIED DERMATITIS: Status: ACTIVE | Noted: 2020-03-06

## 2020-03-06 PROBLEM — D22.5 MELANOCYTIC NEVI OF TRUNK: Status: ACTIVE | Noted: 2020-03-06

## 2020-03-06 PROBLEM — D22.61 MELANOCYTIC NEVI OF RIGHT UPPER LIMB, INCLUDING SHOULDER: Status: ACTIVE | Noted: 2020-03-06

## 2020-03-06 PROBLEM — D18.01 HEMANGIOMA OF SKIN AND SUBCUTANEOUS TISSUE: Status: ACTIVE | Noted: 2020-03-06

## 2020-03-06 PROBLEM — D22.71 MELANOCYTIC NEVI OF RIGHT LOWER LIMB, INCLUDING HIP: Status: ACTIVE | Noted: 2020-03-06

## 2020-03-06 PROBLEM — D23.71 OTHER BENIGN NEOPLASM OF SKIN OF RIGHT LOWER LIMB, INCLUDING HIP: Status: ACTIVE | Noted: 2020-03-06

## 2020-03-06 PROBLEM — D22.62 MELANOCYTIC NEVI OF LEFT UPPER LIMB, INCLUDING SHOULDER: Status: ACTIVE | Noted: 2020-03-06

## 2020-03-06 PROBLEM — D48.5 NEOPLASM OF UNCERTAIN BEHAVIOR OF SKIN: Status: ACTIVE | Noted: 2020-03-06

## 2020-03-06 PROBLEM — D22.72 MELANOCYTIC NEVI OF LEFT LOWER LIMB, INCLUDING HIP: Status: ACTIVE | Noted: 2020-03-06

## 2020-03-06 PROCEDURE — ? COUNSELING

## 2020-03-06 PROCEDURE — ? BIOPSY BY SHAVE METHOD

## 2020-03-06 PROCEDURE — 99203 OFFICE O/P NEW LOW 30 MIN: CPT | Mod: 25

## 2020-03-06 PROCEDURE — 11102 TANGNTL BX SKIN SINGLE LES: CPT

## 2020-03-06 PROCEDURE — ? PRESCRIPTION

## 2020-03-06 RX ORDER — TRIAMCINOLONE ACETONIDE 1 MG/G
CREAM TOPICAL BID
Qty: 1 | Refills: 1 | Status: ERX | COMMUNITY
Start: 2020-03-06

## 2020-03-06 RX ADMIN — TRIAMCINOLONE ACETONIDE: 1 CREAM TOPICAL at 00:00

## 2020-03-06 ASSESSMENT — LOCATION DETAILED DESCRIPTION DERM
LOCATION DETAILED: INFERIOR MID FOREHEAD
LOCATION DETAILED: LEFT NASAL SIDEWALL
LOCATION DETAILED: RIGHT PROXIMAL RADIAL DORSAL FOREARM
LOCATION DETAILED: LOWER STERNUM
LOCATION DETAILED: LEFT ANTERIOR PROXIMAL THIGH
LOCATION DETAILED: RIGHT ANTERIOR DISTAL THIGH
LOCATION DETAILED: RIGHT ANTECUBITAL SKIN
LOCATION DETAILED: PERIUMBILICAL SKIN
LOCATION DETAILED: LEFT VENTRAL PROXIMAL FOREARM
LOCATION DETAILED: RIGHT KNEE
LOCATION DETAILED: RIGHT MID-UPPER BACK
LOCATION DETAILED: RIGHT PROXIMAL PRETIBIAL REGION
LOCATION DETAILED: LEFT KNEE
LOCATION DETAILED: RIGHT MEDIAL FOREHEAD
LOCATION DETAILED: LEFT ANTERIOR PROXIMAL UPPER ARM
LOCATION DETAILED: NASAL TIP
LOCATION DETAILED: LEFT PROXIMAL PRETIBIAL REGION
LOCATION DETAILED: LEFT ANTERIOR DISTAL THIGH
LOCATION DETAILED: EPIGASTRIC SKIN
LOCATION DETAILED: RIGHT ANTERIOR PROXIMAL UPPER ARM
LOCATION DETAILED: RIGHT VENTRAL PROXIMAL FOREARM
LOCATION DETAILED: LEFT PROXIMAL DORSAL FOREARM

## 2020-03-06 ASSESSMENT — LOCATION SIMPLE DESCRIPTION DERM
LOCATION SIMPLE: LEFT NOSE
LOCATION SIMPLE: RIGHT FOREARM
LOCATION SIMPLE: ABDOMEN
LOCATION SIMPLE: CHEST
LOCATION SIMPLE: LEFT PRETIBIAL REGION
LOCATION SIMPLE: RIGHT FOREHEAD
LOCATION SIMPLE: LEFT KNEE
LOCATION SIMPLE: RIGHT KNEE
LOCATION SIMPLE: RIGHT UPPER BACK
LOCATION SIMPLE: RIGHT UPPER ARM
LOCATION SIMPLE: RIGHT THIGH
LOCATION SIMPLE: LEFT FOREARM
LOCATION SIMPLE: RIGHT PRETIBIAL REGION
LOCATION SIMPLE: NOSE
LOCATION SIMPLE: LEFT UPPER ARM
LOCATION SIMPLE: INFERIOR FOREHEAD
LOCATION SIMPLE: LEFT THIGH

## 2020-03-06 ASSESSMENT — LOCATION ZONE DERM
LOCATION ZONE: NOSE
LOCATION ZONE: LEG
LOCATION ZONE: TRUNK
LOCATION ZONE: ARM
LOCATION ZONE: FACE

## 2020-03-06 NOTE — PROCEDURE: BIOPSY BY SHAVE METHOD
Detail Level: Detailed
Depth Of Biopsy: dermis
Was A Bandage Applied: Yes
Size Of Lesion In Cm: 0.5
X Size Of Lesion In Cm: 0.6
Biopsy Type: H and E
Biopsy Method: Dermablade
Anesthesia Type: 1% lidocaine with epinephrine
Additional Anesthesia Volume In Cc (Will Not Render If 0): 0
Hemostasis: Drysol
Wound Care: Petrolatum
Dressing: bandage
Destruction After The Procedure: No
Type Of Destruction Used: Curettage
Curettage Text: The wound bed was treated with curettage after the biopsy was performed.
Cryotherapy Text: The wound bed was treated with cryotherapy after the biopsy was performed.
Electrodesiccation Text: The wound bed was treated with electrodesiccation after the biopsy was performed.
Electrodesiccation And Curettage Text: The wound bed was treated with electrodesiccation and curettage after the biopsy was performed.
Silver Nitrate Text: The wound bed was treated with silver nitrate after the biopsy was performed.
Lab: [id number]
Lab Facility: 
Consent: Written consent was obtained and risks were reviewed including but not limited to scarring, infection, bleeding, scabbing, incomplete removal, nerve damage and allergy to anesthesia.
Post-Care Instructions: I reviewed with the patient in detail post-care instructions. Patient is to keep the biopsy site dry overnight, and then apply bacitracin twice daily until healed. Patient may apply hydrogen peroxide soaks to remove any crusting.
Notification Instructions: Patient will be notified of biopsy results. However, patient instructed to call the office if not contacted within 2 weeks.
Billing Type: Third-Party Bill
Information: Selecting Yes will display possible errors in your note based on the variables you have selected. This validation is only offered as a suggestion for you. PLEASE NOTE THAT THE VALIDATION TEXT WILL BE REMOVED WHEN YOU FINALIZE YOUR NOTE. IF YOU WANT TO FAX A PRELIMINARY NOTE YOU WILL NEED TO TOGGLE THIS TO 'NO' IF YOU DO NOT WANT IT IN YOUR FAXED NOTE.

## 2021-05-31 NOTE — HPI: EVALUATION OF SKIN LESION(S)
How Severe Are Your Spot(S)?: mild
Have Your Spot(S) Been Treated In The Past?: has not been treated
Hpi Title: Evaluation of a Skin Lesion
[facility]

## 2022-03-08 ENCOUNTER — HOSPITAL ENCOUNTER (OUTPATIENT)
Dept: LAB | Facility: MEDICAL CENTER | Age: 47
End: 2022-03-08
Attending: NURSE PRACTITIONER
Payer: COMMERCIAL

## 2022-03-08 LAB
FERRITIN SERPL-MCNC: 108 NG/ML (ref 10–291)
T4 FREE SERPL-MCNC: 0.8 NG/DL (ref 0.93–1.7)
THYROPEROXIDASE AB SERPL-ACNC: 17 IU/ML (ref 0–9)
TSH SERPL DL<=0.005 MIU/L-ACNC: 0.62 UIU/ML (ref 0.38–5.33)

## 2022-03-08 PROCEDURE — 86376 MICROSOMAL ANTIBODY EACH: CPT

## 2022-03-08 PROCEDURE — 86800 THYROGLOBULIN ANTIBODY: CPT

## 2022-03-08 PROCEDURE — 36415 COLL VENOUS BLD VENIPUNCTURE: CPT

## 2022-03-08 PROCEDURE — 84439 ASSAY OF FREE THYROXINE: CPT

## 2022-03-08 PROCEDURE — 84443 ASSAY THYROID STIM HORMONE: CPT

## 2022-03-08 PROCEDURE — 82728 ASSAY OF FERRITIN: CPT

## 2022-03-11 LAB — THYROGLOB AB SERPL-ACNC: <0.9 IU/ML (ref 0–4)

## 2022-04-27 ENCOUNTER — HOSPITAL ENCOUNTER (OUTPATIENT)
Dept: LAB | Facility: MEDICAL CENTER | Age: 47
End: 2022-04-27
Attending: PSYCHIATRY & NEUROLOGY
Payer: COMMERCIAL

## 2022-04-27 LAB
25(OH)D3 SERPL-MCNC: 54 NG/ML (ref 30–100)
ALBUMIN SERPL BCP-MCNC: 4.6 G/DL (ref 3.2–4.9)
ALBUMIN/GLOB SERPL: 1.8 G/DL
ALP SERPL-CCNC: 53 U/L (ref 30–99)
ALT SERPL-CCNC: 25 U/L (ref 2–50)
ANION GAP SERPL CALC-SCNC: 9 MMOL/L (ref 7–16)
AST SERPL-CCNC: 24 U/L (ref 12–45)
BASOPHILS # BLD AUTO: 1.5 % (ref 0–1.8)
BASOPHILS # BLD: 0.07 K/UL (ref 0–0.12)
BILIRUB SERPL-MCNC: 0.2 MG/DL (ref 0.1–1.5)
BUN SERPL-MCNC: 14 MG/DL (ref 8–22)
CALCIUM SERPL-MCNC: 9.9 MG/DL (ref 8.5–10.5)
CHLORIDE SERPL-SCNC: 106 MMOL/L (ref 96–112)
CO2 SERPL-SCNC: 25 MMOL/L (ref 20–33)
CREAT SERPL-MCNC: 0.57 MG/DL (ref 0.5–1.4)
EOSINOPHIL # BLD AUTO: 0.48 K/UL (ref 0–0.51)
EOSINOPHIL NFR BLD: 10.6 % (ref 0–6.9)
ERYTHROCYTE [DISTWIDTH] IN BLOOD BY AUTOMATED COUNT: 41.7 FL (ref 35.9–50)
FOLATE SERPL-MCNC: 7.1 NG/ML
GFR SERPLBLD CREATININE-BSD FMLA CKD-EPI: 113 ML/MIN/1.73 M 2
GLOBULIN SER CALC-MCNC: 2.5 G/DL (ref 1.9–3.5)
GLUCOSE SERPL-MCNC: 94 MG/DL (ref 65–99)
HCT VFR BLD AUTO: 42.2 % (ref 37–47)
HGB BLD-MCNC: 14 G/DL (ref 12–16)
IMM GRANULOCYTES # BLD AUTO: 0.01 K/UL (ref 0–0.11)
IMM GRANULOCYTES NFR BLD AUTO: 0.2 % (ref 0–0.9)
LYMPHOCYTES # BLD AUTO: 1.43 K/UL (ref 1–4.8)
LYMPHOCYTES NFR BLD: 31.5 % (ref 22–41)
MCH RBC QN AUTO: 31.7 PG (ref 27–33)
MCHC RBC AUTO-ENTMCNC: 33.2 G/DL (ref 33.6–35)
MCV RBC AUTO: 95.7 FL (ref 81.4–97.8)
MONOCYTES # BLD AUTO: 0.22 K/UL (ref 0–0.85)
MONOCYTES NFR BLD AUTO: 4.8 % (ref 0–13.4)
NEUTROPHILS # BLD AUTO: 2.33 K/UL (ref 2–7.15)
NEUTROPHILS NFR BLD: 51.4 % (ref 44–72)
NRBC # BLD AUTO: 0 K/UL
NRBC BLD-RTO: 0 /100 WBC
PLATELET # BLD AUTO: 221 K/UL (ref 164–446)
PMV BLD AUTO: 11.1 FL (ref 9–12.9)
POTASSIUM SERPL-SCNC: 4.7 MMOL/L (ref 3.6–5.5)
PROT SERPL-MCNC: 7.1 G/DL (ref 6–8.2)
RBC # BLD AUTO: 4.41 M/UL (ref 4.2–5.4)
SODIUM SERPL-SCNC: 140 MMOL/L (ref 135–145)
VIT B12 SERPL-MCNC: 744 PG/ML (ref 211–911)
WBC # BLD AUTO: 4.5 K/UL (ref 4.8–10.8)

## 2022-04-27 PROCEDURE — 85025 COMPLETE CBC W/AUTO DIFF WBC: CPT

## 2022-04-27 PROCEDURE — 36415 COLL VENOUS BLD VENIPUNCTURE: CPT

## 2022-04-27 PROCEDURE — 80053 COMPREHEN METABOLIC PANEL: CPT

## 2022-04-27 PROCEDURE — 82306 VITAMIN D 25 HYDROXY: CPT

## 2022-04-27 PROCEDURE — 82607 VITAMIN B-12: CPT

## 2022-04-27 PROCEDURE — 82746 ASSAY OF FOLIC ACID SERUM: CPT

## 2022-04-27 PROCEDURE — 80307 DRUG TEST PRSMV CHEM ANLYZR: CPT

## 2022-09-26 DIAGNOSIS — E27.40 ADRENAL INSUFFICIENCY (HCC): ICD-10-CM

## 2022-09-26 RX ORDER — 0.9 % SODIUM CHLORIDE 0.9 %
10 VIAL (ML) INJECTION PRN
Status: CANCELLED | OUTPATIENT
Start: 2022-09-27

## 2022-09-26 RX ORDER — HEPARIN SODIUM (PORCINE) LOCK FLUSH IV SOLN 100 UNIT/ML 100 UNIT/ML
500 SOLUTION INTRAVENOUS PRN
Status: CANCELLED | OUTPATIENT
Start: 2022-09-27

## 2022-09-26 RX ORDER — COSYNTROPIN 0.25 MG/ML
250 INJECTION, POWDER, FOR SOLUTION INTRAMUSCULAR; INTRAVENOUS ONCE
Status: CANCELLED | OUTPATIENT
Start: 2022-09-27 | End: 2022-09-27

## 2022-09-26 RX ORDER — 0.9 % SODIUM CHLORIDE 0.9 %
3 VIAL (ML) INJECTION PRN
Status: CANCELLED | OUTPATIENT
Start: 2022-09-27

## 2022-09-26 RX ORDER — 0.9 % SODIUM CHLORIDE 0.9 %
VIAL (ML) INJECTION PRN
Status: CANCELLED | OUTPATIENT
Start: 2022-09-27

## 2022-10-04 ENCOUNTER — OUTPATIENT INFUSION SERVICES (OUTPATIENT)
Dept: ONCOLOGY | Facility: MEDICAL CENTER | Age: 47
End: 2022-10-04
Attending: INTERNAL MEDICINE
Payer: COMMERCIAL

## 2022-10-04 VITALS
HEIGHT: 64 IN | OXYGEN SATURATION: 100 % | RESPIRATION RATE: 18 BRPM | HEART RATE: 59 BPM | SYSTOLIC BLOOD PRESSURE: 116 MMHG | TEMPERATURE: 97.6 F | WEIGHT: 116.84 LBS | DIASTOLIC BLOOD PRESSURE: 67 MMHG | BODY MASS INDEX: 19.95 KG/M2

## 2022-10-04 DIAGNOSIS — E27.40 ADRENAL INSUFFICIENCY (HCC): ICD-10-CM

## 2022-10-04 LAB
CORTIS SERPL-MCNC: 21.2 UG/DL (ref 0–23)
CORTIS SERPL-MCNC: 28.6 UG/DL (ref 0–23)
CORTIS SERPL-MCNC: 3.8 UG/DL (ref 0–23)

## 2022-10-04 PROCEDURE — 82024 ASSAY OF ACTH: CPT

## 2022-10-04 PROCEDURE — 82533 TOTAL CORTISOL: CPT

## 2022-10-04 PROCEDURE — 700111 HCHG RX REV CODE 636 W/ 250 OVERRIDE (IP): Performed by: INTERNAL MEDICINE

## 2022-10-04 PROCEDURE — 96374 THER/PROPH/DIAG INJ IV PUSH: CPT

## 2022-10-04 RX ORDER — COSYNTROPIN 0.25 MG/ML
250 INJECTION, POWDER, FOR SOLUTION INTRAMUSCULAR; INTRAVENOUS ONCE
Status: COMPLETED | OUTPATIENT
Start: 2022-10-04 | End: 2022-10-04

## 2022-10-04 RX ORDER — ESTRADIOL 0.05 MG/D
PATCH, EXTENDED RELEASE TRANSDERMAL
COMMUNITY
Start: 2022-09-14

## 2022-10-04 RX ORDER — 0.9 % SODIUM CHLORIDE 0.9 %
3 VIAL (ML) INJECTION PRN
Status: CANCELLED | OUTPATIENT
Start: 2022-10-04

## 2022-10-04 RX ORDER — AZELASTINE HYDROCHLORIDE 137 UG/1
SPRAY, METERED NASAL DAILY
COMMUNITY
Start: 2022-08-22

## 2022-10-04 RX ORDER — VORTIOXETINE 10 MG/1
TABLET, FILM COATED ORAL DAILY
COMMUNITY
Start: 2022-10-02

## 2022-10-04 RX ORDER — LEVOTHYROXINE SODIUM 88 UG/1
TABLET ORAL DAILY
COMMUNITY
Start: 2022-09-23

## 2022-10-04 RX ORDER — 0.9 % SODIUM CHLORIDE 0.9 %
VIAL (ML) INJECTION PRN
Status: CANCELLED | OUTPATIENT
Start: 2022-10-04

## 2022-10-04 RX ORDER — 0.9 % SODIUM CHLORIDE 0.9 %
10 VIAL (ML) INJECTION PRN
Status: CANCELLED | OUTPATIENT
Start: 2022-10-04

## 2022-10-04 RX ORDER — HEPARIN SODIUM (PORCINE) LOCK FLUSH IV SOLN 100 UNIT/ML 100 UNIT/ML
500 SOLUTION INTRAVENOUS PRN
Status: CANCELLED | OUTPATIENT
Start: 2022-10-04

## 2022-10-04 RX ORDER — COSYNTROPIN 0.25 MG/ML
250 INJECTION, POWDER, FOR SOLUTION INTRAMUSCULAR; INTRAVENOUS ONCE
Status: CANCELLED | OUTPATIENT
Start: 2022-10-04 | End: 2022-10-04

## 2022-10-04 RX ORDER — PROGESTERONE 100 MG/1
CAPSULE ORAL
COMMUNITY
Start: 2022-09-23

## 2022-10-04 RX ORDER — DEXTROAMPHETAMINE/AMPHETAMINE 15 MG
15 CAPSULE, EXT RELEASE 24 HR ORAL EVERY MORNING
COMMUNITY
Start: 2022-09-30

## 2022-10-04 RX ADMIN — COSYNTROPIN 250 MCG: 0.25 INJECTION, POWDER, LYOPHILIZED, FOR SOLUTION INTRAVENOUS at 08:45

## 2022-10-04 ASSESSMENT — FIBROSIS 4 INDEX: FIB4 SCORE: 1

## 2022-10-04 NOTE — PROGRESS NOTES
Pt arrives to [facility] for STIM test for adrenal insufficiency.  Discussed plan of care with pt.  PIV established to R-AC and baseline ACTH/Cortisol level was drawn.  Cosyntropin 250 mcg IVP given.  30 minute and 60 minute post cortisol level drawn.  Pt reported facial flushing initially that resolved by last lab draw.  PIV removed and site wrapped with pressure dressing.  Pt will follow up with Dr. Herring as directed.  Pt dc home to self care.

## 2022-10-07 LAB — ACTH PLAS-MCNC: 10.4 PG/ML (ref 7.2–63.3)

## 2024-02-08 ENCOUNTER — HOSPITAL ENCOUNTER (OUTPATIENT)
Dept: LAB | Facility: MEDICAL CENTER | Age: 49
End: 2024-02-08
Attending: NURSE PRACTITIONER
Payer: OTHER MISCELLANEOUS

## 2024-02-08 LAB
DHEA-S SERPL-MCNC: 14.3 UG/DL (ref 35.4–256)
ESTRADIOL SERPL-MCNC: 143 PG/ML
PROGEST SERPL-MCNC: <0.05 NG/ML

## 2024-02-08 PROCEDURE — 36415 COLL VENOUS BLD VENIPUNCTURE: CPT

## 2024-02-08 PROCEDURE — 84403 ASSAY OF TOTAL TESTOSTERONE: CPT

## 2024-02-08 PROCEDURE — 84402 ASSAY OF FREE TESTOSTERONE: CPT

## 2024-02-08 PROCEDURE — 84270 ASSAY OF SEX HORMONE GLOBUL: CPT

## 2024-02-08 PROCEDURE — 82670 ASSAY OF TOTAL ESTRADIOL: CPT

## 2024-02-08 PROCEDURE — 82627 DEHYDROEPIANDROSTERONE: CPT

## 2024-02-08 PROCEDURE — 84144 ASSAY OF PROGESTERONE: CPT

## 2024-02-14 LAB
SHBG SERPL-SCNC: 192 NMOL/L (ref 25–122)
TESTOST FREE SERPL-MCNC: 0.1 PG/ML (ref 1.1–5.8)
TESTOST SERPL-MCNC: 2 NG/DL (ref 9–55)

## 2024-03-12 RX ORDER — 0.9 % SODIUM CHLORIDE 0.9 %
10 VIAL (ML) INJECTION PRN
OUTPATIENT
Start: 2024-03-13

## 2024-03-12 RX ORDER — 0.9 % SODIUM CHLORIDE 0.9 %
VIAL (ML) INJECTION PRN
OUTPATIENT
Start: 2024-03-13

## 2024-03-12 RX ORDER — 0.9 % SODIUM CHLORIDE 0.9 %
3 VIAL (ML) INJECTION PRN
OUTPATIENT
Start: 2024-03-13

## 2024-03-12 RX ORDER — COSYNTROPIN 0.25 MG/ML
250 INJECTION, POWDER, FOR SOLUTION INTRAMUSCULAR; INTRAVENOUS ONCE
OUTPATIENT
Start: 2024-03-13 | End: 2024-03-13

## 2024-07-11 ENCOUNTER — HOSPITAL ENCOUNTER (OUTPATIENT)
Dept: RADIOLOGY | Facility: MEDICAL CENTER | Age: 49
End: 2024-07-11
Payer: OTHER MISCELLANEOUS

## 2024-07-18 ENCOUNTER — HOSPITAL ENCOUNTER (OUTPATIENT)
Dept: RADIOLOGY | Facility: MEDICAL CENTER | Age: 49
End: 2024-07-18
Attending: NURSE PRACTITIONER
Payer: OTHER MISCELLANEOUS

## 2024-07-18 DIAGNOSIS — N63.10 MASS OF RIGHT BREAST, UNSPECIFIED QUADRANT: ICD-10-CM

## 2024-07-18 PROCEDURE — 76642 ULTRASOUND BREAST LIMITED: CPT | Mod: RT

## 2024-07-18 PROCEDURE — G0279 TOMOSYNTHESIS, MAMMO: HCPCS

## 2025-02-20 ENCOUNTER — HOSPITAL ENCOUNTER (OUTPATIENT)
Dept: RADIOLOGY | Facility: MEDICAL CENTER | Age: 50
End: 2025-02-20
Attending: NURSE PRACTITIONER
Payer: COMMERCIAL

## 2025-02-20 DIAGNOSIS — N63.10 MASS OF MULTIPLE SITES OF RIGHT BREAST: ICD-10-CM

## 2025-02-20 PROCEDURE — 76642 ULTRASOUND BREAST LIMITED: CPT | Mod: RT

## (undated) DEVICE — DERMABOND ADVANCED - (12EA/BX)

## (undated) DEVICE — ELECTRODE 850 FOAM ADHESIVE - HYDROGEL RADIOTRNSPRNT (50/PK)

## (undated) DEVICE — ROBOTIC SURGERY SERVICES

## (undated) DEVICE — WATER IRRIG. STER 3000 ML - (4/CA)

## (undated) DEVICE — PAD SANITARY 11IN MAXI IND WRAPPED  (12EA/PK 24PK/CA)

## (undated) DEVICE — TRAY FOLEY CATHETER STATLOCK 16FR SURESTEP  (10EA/CA)

## (undated) DEVICE — TROCAR Z THREAD BLADED 12 X 150 (6/BX)

## (undated) DEVICE — ELECTRODE DUAL RETURN W/ CORD - (50/PK)

## (undated) DEVICE — TUBING CLEARLINK DUO-VENT - C-FLO (48EA/CA)

## (undated) DEVICE — CATHETER IV 20 GA X 1-1/4 ---SURG.& SDS ONLY--- (50EA/BX)

## (undated) DEVICE — PACK GYN DAVINCI (2EA/CA)

## (undated) DEVICE — GOWN WARMING STANDARD FLEX - (30/CA)

## (undated) DEVICE — CANISTER SUCTION RIGID RED 1500CC (40EA/CA)

## (undated) DEVICE — PACK MINOR BASIN - (2EA/CA)

## (undated) DEVICE — DRAPE LAPAROTOMY T SHEET - (12EA/CA)

## (undated) DEVICE — TUBE CONNECT SUCTION CLEAR 120 X 1/4" (50EA/CA)"

## (undated) DEVICE — SPONGE GAUZESTER. 2X2 4-PL - (2/PK 50PK/BX 30BX/CS)

## (undated) DEVICE — TUBE CONNECTING SUCTION - CLEAR PLASTIC STERILE 72 IN (50EA/CA)

## (undated) DEVICE — GLOVE BIOGEL SZ 6.5 SURGICAL PF LTX (50PR/BX 4BX/CA)

## (undated) DEVICE — DRESSING TRANSPARENT FILM TEGADERM 4 X 4.75" (50EA/BX)"

## (undated) DEVICE — SUTURE 2-0 VICRYL PLUS CT-1 36 (36PK/BX)"

## (undated) DEVICE — KIT CUSTOM PROCEDURE SINGLE FOR ENDO  (15/CA)

## (undated) DEVICE — TROCAR5X55 KII SHIELDED SYS - (6/BX)

## (undated) DEVICE — SLEEVE, VASO, THIGH, MED

## (undated) DEVICE — SUTURE 0 VICRYL PLUS CT-2 - 27 INCH (36/BX)

## (undated) DEVICE — TROCAR 5X100 BLADED Z-THREAD - KII (6/BX)

## (undated) DEVICE — SUTURE GENERAL

## (undated) DEVICE — NEPTUNE 4 PORT MANIFOLD - (20/PK)

## (undated) DEVICE — DRESSING TRANSPARENT FILM TEGADERM 2.375 X 2.75"  (100EA/BX)"

## (undated) DEVICE — WATER IRRIG. STER. 1500 ML - (9/CA)

## (undated) DEVICE — SET IRRIGATION CYSTOSCOPY TUBE L80 IN (20EA/CA)

## (undated) DEVICE — SUTURE 4-0 VICRYL PLUS FS-2 - 27 INCH (36/BX)

## (undated) DEVICE — KIT ROOM DECONTAMINATION

## (undated) DEVICE — TRAY SRGPRP PVP IOD WT PRP - (20/CA)

## (undated) DEVICE — SET LEADWIRE 5 LEAD BEDSIDE DISPOSABLE ECG (1SET OF 5/EA)

## (undated) DEVICE — PROTECTOR ULNA NERVE - (36PR/CA)

## (undated) DEVICE — GLOVE BIOGEL SZ 8 SURGICAL PF LTX - (50PR/BX 4BX/CA)

## (undated) DEVICE — LIGASURE 5MM BLUNT TIP LONG - 44CM (6EA/PK)

## (undated) DEVICE — MASK ANESTHESIA ADULT  - (100/CA)

## (undated) DEVICE — HEAD HOLDER JUNIOR/ADULT

## (undated) DEVICE — SYRINGE 30 ML LS (56/BX)

## (undated) DEVICE — SET SUCTION/IRRIGATION WITH DISPOSABLE TIP (6/CA )PART #0250-070-520 IS A SUB

## (undated) DEVICE — SUTURE 3-0 VICRYL PLUS - 12 X 18 INCH (12/BX)

## (undated) DEVICE — SUTURE 0 VICRYL PLUS UR-6 - 27 INCH (36/BX)

## (undated) DEVICE — SHEAR, HARMONIC CRVD 8MM

## (undated) DEVICE — SUCTION INSTRUMENT YANKAUER BULBOUS TIP W/O VENT (50EA/CA)

## (undated) DEVICE — GLOVE SZ 6.5 BIOGEL PI MICRO - PF LF (50PR/BX)

## (undated) DEVICE — CANISTER SUCTION 3000ML MECHANICAL FILTER AUTO SHUTOFF MEDI-VAC NONSTERILE LF DISP  (40EA/CA)

## (undated) DEVICE — TROCARCANN&SEAL 5X55 ZTHREAD - 12/BX

## (undated) DEVICE — KIT ANESTHESIA W/CIRCUIT & 3/LT BAG W/FILTER (20EA/CA)

## (undated) DEVICE — SUTURE 3-0 MONOCRYL SH (36PK/BX)

## (undated) DEVICE — SODIUM CHL IRRIGATION 0.9% 1000ML (12EA/CA)

## (undated) DEVICE — LACTATED RINGERS INJ 1000 ML - (14EA/CA 60CA/PF)

## (undated) DEVICE — OBTURATOR 8MM BLADELESS - (24EA/BX) DA VINCI

## (undated) DEVICE — GLOVE BIOGEL SZ 7 SURGICAL PF LTX - (50PR/BX 4BX/CA)

## (undated) DEVICE — FORCEP BIPOLAR MARYLAND DA VINCI 10X'S REUSABLE (10UN/EA)

## (undated) DEVICE — TUBING SETDISPOS HIGH FLOW II - (10/BX)

## (undated) DEVICE — SENSOR SPO2 NEO LNCS ADHESIVE (20/BX) SEE USER NOTES

## (undated) DEVICE — GLOVE BIOGEL PI INDICATOR SZ 6.5 SURGICAL PF LF - (50/BX 4BX/CA)

## (undated) DEVICE — INSERT HARMONIC CRVD SHEAR - (5/BX) DA VINCI

## (undated) DEVICE — GLOVE BIOGEL PI INDICATOR SZ 7.0 SURGICAL PF LF - (50/BX 4BX/CA)

## (undated) DEVICE — UTERINE MANIP V-CARE STANDARD DAVINCI (8EA/CA)

## (undated) DEVICE — KIT  I.V. START (100EA/CA)

## (undated) DEVICE — SUTURE 0 VICRYL PLUS CT-2 - 8 X 18 INCH (12/BX)

## (undated) DEVICE — WATER IRRIGATION STERILE 1000ML (12EA/CA)

## (undated) DEVICE — GLOVE BIOGEL SZ 7.5 SURGICAL PF LTX - (50PR/BX 4BX/CA)

## (undated) DEVICE — BLADE SURGICAL #10 - (50/BX)

## (undated) DEVICE — SUTURE 0 VICRYL PLUS CT-1 - 36 INCH (36/BX)

## (undated) DEVICE — TUBE E-T HI-LO CUFF 7.0MM (10EA/PK)

## (undated) DEVICE — SUTURE 0 PDS CT-2 (36PK/BX)

## (undated) DEVICE — NEEDLE INSFL 120MM 14GA VRRS - (20/BX)

## (undated) DEVICE — PENCIL ELECTSURG 10FT BTN SWH - (50/CA)

## (undated) DEVICE — GAUZE FLUFF STERILE 2-PLY 36 X 36 (100EA/CA)

## (undated) DEVICE — GLOVE, BIOGEL ECLIPSE, SZ 7.0, PF LTX (50/BX)

## (undated) DEVICE — SEAL CANNULA DA VINCI - (10/BX)

## (undated) DEVICE — SET EXTENSION WITH 2 PORTS (48EA/CA) ***PART #2C8610 IS A SUBSTITUTE*****

## (undated) DEVICE — GLOVE BIOGEL INDICATOR SZ 7SURGICAL PF LTX - (50/BX 4BX/CA)

## (undated) DEVICE — JELLY, KY 2 0Z STERILE

## (undated) DEVICE — TROCAR LAPSCP 100MM 12MM NTHRD - (6/BX)

## (undated) DEVICE — ARMREST CRADLE FOAM - (2PR/PK 12PR/CA)

## (undated) DEVICE — SOD. CHL. INJ. 0.9% 1000 ML - (14EA/CA 60CA/PF)

## (undated) DEVICE — PAD OR TABLE DA VINCI 2IN X 20IN X 72IN - (12EA/CA)

## (undated) DEVICE — BRIEF STRETCH MATERNITY M/L - FITS 20-60IN (5EA/BG 20BG/CA)

## (undated) DEVICE — GOWN SURGEONS X-LARGE - DISP. (30/CA)

## (undated) DEVICE — TUBE E-T HI-LO CUFF 7.5MM (10EA/PK)

## (undated) DEVICE — BAG RETRIEVAL 10ML (10EA/BX)

## (undated) DEVICE — SUTURE 3-0 VICRYL PLUS SH - 8X 18 INCH (12/BX)

## (undated) DEVICE — NEEDLE DRIVER SUTURE CUT - DA VINCI 10X'S REUSABLE

## (undated) DEVICE — PACK LAPAROSCOPY - (1/CA)

## (undated) DEVICE — DRAPE 3 ARM DA VANCI SI - (5EA/CA)

## (undated) DEVICE — CHLORAPREP 26 ML APPLICATOR - ORANGE TINT(25/CA)

## (undated) DEVICE — GLOVE BIOGEL INDICATOR SZ 8.5 SURGICAL PF LTX - (50/BX 4BX/CA)

## (undated) DEVICE — SYRINGE SAFETY 3 ML 18 GA X 1 1/2 BLUNT LL (100/BX 8BX/CA)